# Patient Record
Sex: MALE | Race: WHITE | NOT HISPANIC OR LATINO | Employment: FULL TIME | URBAN - METROPOLITAN AREA
[De-identification: names, ages, dates, MRNs, and addresses within clinical notes are randomized per-mention and may not be internally consistent; named-entity substitution may affect disease eponyms.]

---

## 2023-03-27 DIAGNOSIS — I10 HYPERTENSION, UNSPECIFIED TYPE: ICD-10-CM

## 2023-03-27 RX ORDER — AMLODIPINE BESYLATE 5 MG/1
5 TABLET ORAL DAILY
Qty: 30 TABLET | Refills: 0 | Status: SHIPPED | OUTPATIENT
Start: 2023-03-27 | End: 2023-04-05

## 2023-03-27 RX ORDER — TAMSULOSIN HYDROCHLORIDE 0.4 MG/1
0.4 CAPSULE ORAL DAILY
Qty: 30 CAPSULE | Refills: 0 | Status: SHIPPED | OUTPATIENT
Start: 2023-03-27 | End: 2023-04-05

## 2023-04-05 DIAGNOSIS — N40.1 BENIGN PROSTATIC HYPERPLASIA WITH LOWER URINARY TRACT SYMPTOMS, SYMPTOM DETAILS UNSPECIFIED: Primary | ICD-10-CM

## 2023-04-05 DIAGNOSIS — I10 HYPERTENSION, UNSPECIFIED TYPE: ICD-10-CM

## 2023-04-05 RX ORDER — AMLODIPINE BESYLATE 5 MG/1
TABLET ORAL
Qty: 30 TABLET | Refills: 0 | Status: SHIPPED | OUTPATIENT
Start: 2023-04-05 | End: 2023-04-17 | Stop reason: SDUPTHER

## 2023-04-05 RX ORDER — TAMSULOSIN HYDROCHLORIDE 0.4 MG/1
CAPSULE ORAL
Qty: 30 CAPSULE | Refills: 0 | Status: SHIPPED | OUTPATIENT
Start: 2023-04-05 | End: 2023-04-17 | Stop reason: SDUPTHER

## 2023-04-17 ENCOUNTER — OFFICE VISIT (OUTPATIENT)
Dept: PRIMARY CARE | Facility: CLINIC | Age: 53
End: 2023-04-17
Payer: COMMERCIAL

## 2023-04-17 VITALS
HEIGHT: 73 IN | BODY MASS INDEX: 29.29 KG/M2 | HEART RATE: 65 BPM | DIASTOLIC BLOOD PRESSURE: 80 MMHG | SYSTOLIC BLOOD PRESSURE: 126 MMHG | WEIGHT: 221 LBS

## 2023-04-17 DIAGNOSIS — I10 PRIMARY HYPERTENSION: ICD-10-CM

## 2023-04-17 DIAGNOSIS — F90.1 ADHD, HYPERACTIVE-IMPULSIVE TYPE: Primary | ICD-10-CM

## 2023-04-17 DIAGNOSIS — M19.049 HAND ARTHRITIS: ICD-10-CM

## 2023-04-17 DIAGNOSIS — R35.0 BENIGN PROSTATIC HYPERPLASIA WITH URINARY FREQUENCY: ICD-10-CM

## 2023-04-17 DIAGNOSIS — N40.1 BENIGN PROSTATIC HYPERPLASIA WITH URINARY FREQUENCY: ICD-10-CM

## 2023-04-17 PROBLEM — N40.0 BPH (BENIGN PROSTATIC HYPERPLASIA): Status: ACTIVE | Noted: 2023-04-17

## 2023-04-17 PROCEDURE — 99214 OFFICE O/P EST MOD 30 MIN: CPT | Performed by: FAMILY MEDICINE

## 2023-04-17 PROCEDURE — 3079F DIAST BP 80-89 MM HG: CPT | Performed by: FAMILY MEDICINE

## 2023-04-17 PROCEDURE — 3074F SYST BP LT 130 MM HG: CPT | Performed by: FAMILY MEDICINE

## 2023-04-17 RX ORDER — AMLODIPINE BESYLATE 5 MG/1
TABLET ORAL
Qty: 90 TABLET | Refills: 1 | Status: SHIPPED | OUTPATIENT
Start: 2023-04-17 | End: 2023-08-17 | Stop reason: SDUPTHER

## 2023-04-17 RX ORDER — DEXTROAMPHETAMINE SACCHARATE, AMPHETAMINE ASPARTATE MONOHYDRATE, DEXTROAMPHETAMINE SULFATE AND AMPHETAMINE SULFATE 5; 5; 5; 5 MG/1; MG/1; MG/1; MG/1
20 CAPSULE, EXTENDED RELEASE ORAL EVERY MORNING
Qty: 30 CAPSULE | Refills: 0 | Status: CANCELLED | OUTPATIENT
Start: 2023-05-17 | End: 2023-06-16

## 2023-04-17 RX ORDER — DEXTROAMPHETAMINE SACCHARATE, AMPHETAMINE ASPARTATE MONOHYDRATE, DEXTROAMPHETAMINE SULFATE AND AMPHETAMINE SULFATE 5; 5; 5; 5 MG/1; MG/1; MG/1; MG/1
20 CAPSULE, EXTENDED RELEASE ORAL EVERY MORNING
Qty: 90 CAPSULE | Refills: 0 | Status: SHIPPED | OUTPATIENT
Start: 2023-04-17 | End: 2023-04-18 | Stop reason: RX

## 2023-04-17 RX ORDER — DEXTROAMPHETAMINE SACCHARATE, AMPHETAMINE ASPARTATE, DEXTROAMPHETAMINE SULFATE AND AMPHETAMINE SULFATE 2.5; 2.5; 2.5; 2.5 MG/1; MG/1; MG/1; MG/1
10 TABLET ORAL DAILY
Qty: 90 TABLET | Refills: 0 | Status: SHIPPED | OUTPATIENT
Start: 2023-04-17 | End: 2023-04-18 | Stop reason: RX

## 2023-04-17 RX ORDER — DEXTROAMPHETAMINE SACCHARATE, AMPHETAMINE ASPARTATE, DEXTROAMPHETAMINE SULFATE AND AMPHETAMINE SULFATE 2.5; 2.5; 2.5; 2.5 MG/1; MG/1; MG/1; MG/1
10 TABLET ORAL DAILY
Qty: 30 TABLET | Refills: 0 | Status: CANCELLED | OUTPATIENT
Start: 2023-04-17 | End: 2023-05-17

## 2023-04-17 RX ORDER — TAMSULOSIN HYDROCHLORIDE 0.4 MG/1
0.4 CAPSULE ORAL DAILY
Qty: 90 CAPSULE | Refills: 1 | Status: SHIPPED | OUTPATIENT
Start: 2023-04-17 | End: 2023-08-17 | Stop reason: WASHOUT

## 2023-04-17 RX ORDER — DEXTROAMPHETAMINE SACCHARATE, AMPHETAMINE ASPARTATE MONOHYDRATE, DEXTROAMPHETAMINE SULFATE AND AMPHETAMINE SULFATE 5; 5; 5; 5 MG/1; MG/1; MG/1; MG/1
20 CAPSULE, EXTENDED RELEASE ORAL EVERY MORNING
Qty: 30 CAPSULE | Refills: 0 | Status: CANCELLED | OUTPATIENT
Start: 2023-04-17 | End: 2023-05-17

## 2023-04-17 RX ORDER — DEXTROAMPHETAMINE SULFATE, DEXTROAMPHETAMINE SACCHARATE, AMPHETAMINE SULFATE AND AMPHETAMINE ASPARTATE 5; 5; 5; 5 MG/1; MG/1; MG/1; MG/1
CAPSULE, EXTENDED RELEASE ORAL
COMMUNITY
Start: 2022-10-09 | End: 2023-04-17 | Stop reason: ALTCHOICE

## 2023-04-17 RX ORDER — DEXTROAMPHETAMINE SACCHARATE, AMPHETAMINE ASPARTATE, DEXTROAMPHETAMINE SULFATE AND AMPHETAMINE SULFATE 2.5; 2.5; 2.5; 2.5 MG/1; MG/1; MG/1; MG/1
10 TABLET ORAL DAILY
Qty: 30 TABLET | Refills: 0 | Status: CANCELLED | OUTPATIENT
Start: 2023-05-17 | End: 2023-06-16

## 2023-04-17 RX ORDER — DEXTROAMPHETAMINE SACCHARATE, AMPHETAMINE ASPARTATE, DEXTROAMPHETAMINE SULFATE AND AMPHETAMINE SULFATE 2.5; 2.5; 2.5; 2.5 MG/1; MG/1; MG/1; MG/1
TABLET ORAL
COMMUNITY
End: 2023-04-17 | Stop reason: ALTCHOICE

## 2023-04-17 ASSESSMENT — PATIENT HEALTH QUESTIONNAIRE - PHQ9
1. LITTLE INTEREST OR PLEASURE IN DOING THINGS: NOT AT ALL
2. FEELING DOWN, DEPRESSED OR HOPELESS: NOT AT ALL
SUM OF ALL RESPONSES TO PHQ9 QUESTIONS 1 AND 2: 0

## 2023-04-17 NOTE — PROGRESS NOTES
"Subjective   Patient ID: Leo Lambert Jr. is a 53 y.o. male who presents for ADHD, Hypertension, Benign Prostatic Hypertrophy, and Arthritis.    HPI  1.  ADHD.  Currently taking Adderall 20 mg ER each morning and 10 mg in the afternoon PRN  States this continues to work well for focus and attention   Denies issues with mood/anxiety, sleep, or appetite   Controlled substance and drug screen done July 2022  Tolerating well and requesting refill today     2.  Hypertension.  Currently taking amlodipine 5 mg daily   Tolerating well without side effects   Denies cardiopulmonary symptoms   Requesting refill today     3. BPH  Currently taking tamsulosin 0.4 mg   States this continues to control urinary symptoms well   Tolerating well and requesting refill     4. R hand arthritis   States he has been experiencing more pain in his R hand recently   Does have history of arthritis for which he received steroid shots back in 2017  States the steroid injection did provide relief for some time   He is inquiring about further treatment or repeat injections     Review of Systems  All pertinent positive symptoms are included in the history of present illness.    All other systems have been reviewed and are negative and noncontributory to this patient's current ailments.    No Known Allergies      There is no immunization history on file for this patient.    Objective   Visit Vitals  /80 (BP Location: Left arm, Patient Position: Sitting, BP Cuff Size: Adult)   Pulse 65   Ht 1.854 m (6' 1\")   Wt 100 kg (221 lb)   BMI 29.16 kg/m²   Smoking Status Never   BSA 2.27 m²       Physical Exam  CONSTITUTIONAL - well nourished, well developed, looks like stated age, in no acute distress, not ill-appearing, and not tired appearing  SKIN - normal skin color and pigmentation, normal skin turgor without rash, lesions, or nodules visualized  HEAD - no trauma, normocephalic  EYES - extraocular muscles are intact, and normal external " exam  CARDIAC - regular rate and regular rhythm, no skipped beats, no murmur  EXTREMITIES - no edema, no deformities  NEUROLOGICAL - normal gait, normal balance, normal motor, no ataxia, alert, oriented  PSYCHIATRIC - alert, pleasant and cordial, age-appropriate      Assessment/Plan   Problem List Items Addressed This Visit       ADHD, hyperactive-impulsive type - Primary     Stable and well controlled on current medication.   Will continue Adderall ER 20 mg and Adderall 10 mg in the afternoon as needed.   UDS and controlled substance agreement up to date.   Refills provided today.            Relevant Medications    amphetamine-dextroamphetamine XR (Adderall XR) 20 mg 24 hr capsule    amphetamine-dextroamphetamine (Adderall) 10 mg tablet    Primary hypertension     Stable and well controlled.  Continue amlodipine 5 mg daily.   Refill provided today.          Relevant Medications    amLODIPine (Norvasc) 5 mg tablet    BPH (benign prostatic hyperplasia)     Stable and well controlled.   Continue tamsulosin 0.4 mg daily.   Refill provided today.         Relevant Medications    tamsulosin (Flomax) 0.4 mg 24 hr capsule    Hand arthritis     Information for a hand surgeon was provided today.   It does appear that you last received steroid injection to the MC joint of the R long finger as well as aspiration and injection of a R hand ganglion cyst back in 2017 with Dr. Misha Hernandez.           I reviewed the attendee's documentation and discussed the patient with the attendee. I agree with the attendee's medical decision making as documented on the attendee's note.

## 2023-04-17 NOTE — ASSESSMENT & PLAN NOTE
Information for a hand surgeon was provided today.   It does appear that you last received steroid injection to the MC joint of the R long finger as well as aspiration and injection of a R hand ganglion cyst back in 2017 with Dr. Misha Hernandez.

## 2023-04-17 NOTE — ASSESSMENT & PLAN NOTE
Stable and well controlled on current medication.   Will continue Adderall ER 20 mg and Adderall 10 mg in the afternoon as needed.   UDS and controlled substance agreement up to date.   Refills provided today.

## 2023-04-18 ENCOUNTER — TELEPHONE (OUTPATIENT)
Dept: PRIMARY CARE | Facility: CLINIC | Age: 53
End: 2023-04-18
Payer: COMMERCIAL

## 2023-04-18 DIAGNOSIS — F90.1 ADHD, HYPERACTIVE-IMPULSIVE TYPE: Primary | ICD-10-CM

## 2023-04-18 RX ORDER — DEXTROAMPHETAMINE SACCHARATE, AMPHETAMINE ASPARTATE, DEXTROAMPHETAMINE SULFATE AND AMPHETAMINE SULFATE 2.5; 2.5; 2.5; 2.5 MG/1; MG/1; MG/1; MG/1
TABLET ORAL
Qty: 30 TABLET | Refills: 0 | Status: SHIPPED | OUTPATIENT
Start: 2023-05-18 | End: 2023-08-17 | Stop reason: ALTCHOICE

## 2023-04-18 RX ORDER — DEXTROAMPHETAMINE SACCHARATE, AMPHETAMINE ASPARTATE, DEXTROAMPHETAMINE SULFATE AND AMPHETAMINE SULFATE 2.5; 2.5; 2.5; 2.5 MG/1; MG/1; MG/1; MG/1
TABLET ORAL
Qty: 30 TABLET | Refills: 0 | Status: SHIPPED | OUTPATIENT
Start: 2023-06-17 | End: 2023-08-17 | Stop reason: ALTCHOICE

## 2023-04-18 RX ORDER — DEXTROAMPHETAMINE SACCHARATE, AMPHETAMINE ASPARTATE MONOHYDRATE, DEXTROAMPHETAMINE SULFATE AND AMPHETAMINE SULFATE 5; 5; 5; 5 MG/1; MG/1; MG/1; MG/1
20 CAPSULE, EXTENDED RELEASE ORAL EVERY MORNING
Qty: 30 CAPSULE | Refills: 0 | Status: SHIPPED | OUTPATIENT
Start: 2023-04-18 | End: 2023-08-17 | Stop reason: ALTCHOICE

## 2023-04-18 RX ORDER — DEXTROAMPHETAMINE SACCHARATE, AMPHETAMINE ASPARTATE, DEXTROAMPHETAMINE SULFATE AND AMPHETAMINE SULFATE 2.5; 2.5; 2.5; 2.5 MG/1; MG/1; MG/1; MG/1
TABLET ORAL
Qty: 30 TABLET | Refills: 0 | Status: SHIPPED | OUTPATIENT
Start: 2023-04-18 | End: 2023-08-17 | Stop reason: ALTCHOICE

## 2023-04-18 RX ORDER — DEXTROAMPHETAMINE SACCHARATE, AMPHETAMINE ASPARTATE MONOHYDRATE, DEXTROAMPHETAMINE SULFATE AND AMPHETAMINE SULFATE 5; 5; 5; 5 MG/1; MG/1; MG/1; MG/1
20 CAPSULE, EXTENDED RELEASE ORAL EVERY MORNING
Qty: 30 CAPSULE | Refills: 0 | Status: SHIPPED | OUTPATIENT
Start: 2023-05-18 | End: 2023-08-17 | Stop reason: ALTCHOICE

## 2023-04-18 RX ORDER — DEXTROAMPHETAMINE SACCHARATE, AMPHETAMINE ASPARTATE MONOHYDRATE, DEXTROAMPHETAMINE SULFATE AND AMPHETAMINE SULFATE 5; 5; 5; 5 MG/1; MG/1; MG/1; MG/1
20 CAPSULE, EXTENDED RELEASE ORAL EVERY MORNING
Qty: 30 CAPSULE | Refills: 0 | Status: SHIPPED | OUTPATIENT
Start: 2023-06-17 | End: 2023-08-17 | Stop reason: ALTCHOICE

## 2023-08-17 ENCOUNTER — OFFICE VISIT (OUTPATIENT)
Dept: PRIMARY CARE | Facility: CLINIC | Age: 53
End: 2023-08-17
Payer: COMMERCIAL

## 2023-08-17 VITALS
WEIGHT: 220 LBS | SYSTOLIC BLOOD PRESSURE: 132 MMHG | DIASTOLIC BLOOD PRESSURE: 90 MMHG | BODY MASS INDEX: 29.03 KG/M2 | HEART RATE: 70 BPM

## 2023-08-17 DIAGNOSIS — F90.1 ADHD, HYPERACTIVE-IMPULSIVE TYPE: ICD-10-CM

## 2023-08-17 DIAGNOSIS — R20.0 NUMBNESS OF LIP: ICD-10-CM

## 2023-08-17 DIAGNOSIS — M17.32 POST-TRAUMATIC OSTEOARTHRITIS OF LEFT KNEE: ICD-10-CM

## 2023-08-17 DIAGNOSIS — Z79.899 MEDICATION MANAGEMENT: ICD-10-CM

## 2023-08-17 DIAGNOSIS — Z12.5 PROSTATE CANCER SCREENING: ICD-10-CM

## 2023-08-17 DIAGNOSIS — G89.29 CHRONIC PAIN OF LEFT KNEE: ICD-10-CM

## 2023-08-17 DIAGNOSIS — M25.562 CHRONIC PAIN OF LEFT KNEE: ICD-10-CM

## 2023-08-17 DIAGNOSIS — Z11.59 NEED FOR HEPATITIS C SCREENING TEST: ICD-10-CM

## 2023-08-17 DIAGNOSIS — I10 PRIMARY HYPERTENSION: ICD-10-CM

## 2023-08-17 DIAGNOSIS — M25.862 CYST OF LEFT KNEE JOINT: ICD-10-CM

## 2023-08-17 DIAGNOSIS — Z00.00 ANNUAL PHYSICAL EXAM: Primary | ICD-10-CM

## 2023-08-17 DIAGNOSIS — Z11.4 ENCOUNTER FOR SCREENING FOR HIV: ICD-10-CM

## 2023-08-17 DIAGNOSIS — M19.041 PRIMARY OSTEOARTHRITIS OF RIGHT HAND: ICD-10-CM

## 2023-08-17 PROBLEM — M51.36 DEGENERATION OF INTERVERTEBRAL DISC OF LUMBAR REGION: Status: ACTIVE | Noted: 2023-08-17

## 2023-08-17 PROBLEM — M47.816 FACET ARTHROPATHY, LUMBAR: Status: ACTIVE | Noted: 2023-08-17

## 2023-08-17 PROBLEM — D56.3 THALASSEMIA TRAIT: Status: ACTIVE | Noted: 2023-08-17

## 2023-08-17 PROBLEM — M51.369 DEGENERATION OF INTERVERTEBRAL DISC OF LUMBAR REGION: Status: ACTIVE | Noted: 2023-08-17

## 2023-08-17 PROBLEM — G47.33 OBSTRUCTIVE SLEEP APNEA, ADULT: Status: ACTIVE | Noted: 2023-08-17

## 2023-08-17 PROBLEM — R01.1 SYSTOLIC MURMUR: Status: ACTIVE | Noted: 2023-08-17

## 2023-08-17 LAB
AMPHETAMINE (PRESENCE) IN URINE BY SCREEN METHOD: ABNORMAL
BARBITURATES PRESENCE IN URINE BY SCREEN METHOD: ABNORMAL
BENZODIAZEPINE (PRESENCE) IN URINE BY SCREEN METHOD: ABNORMAL
CANNABINOIDS IN URINE BY SCREEN METHOD: ABNORMAL
COCAINE (PRESENCE) IN URINE BY SCREEN METHOD: ABNORMAL
DRUG SCREEN COMMENT URINE: ABNORMAL
FENTANYL URINE: ABNORMAL
METHADONE (PRESENCE) IN URINE BY SCREEN METHOD: ABNORMAL
OPIATES (PRESENCE) IN URINE BY SCREEN METHOD: ABNORMAL
OXYCODONE (PRESENCE) IN URINE BY SCREEN METHOD: ABNORMAL
PHENCYCLIDINE (PRESENCE) IN URINE BY SCREEN METHOD: ABNORMAL
POC APPEARANCE, URINE: CLEAR
POC BILIRUBIN, URINE: NEGATIVE
POC BLOOD, URINE: NEGATIVE
POC COLOR, URINE: YELLOW
POC GLUCOSE, URINE: NEGATIVE MG/DL
POC KETONES, URINE: NEGATIVE MG/DL
POC LEUKOCYTES, URINE: NEGATIVE
POC NITRITE,URINE: NEGATIVE
POC PH, URINE: 6.5 PH
POC PROTEIN, URINE: NEGATIVE MG/DL
POC SPECIFIC GRAVITY, URINE: 1.01
POC UROBILINOGEN, URINE: 0.2 EU/DL

## 2023-08-17 PROCEDURE — 81002 URINALYSIS NONAUTO W/O SCOPE: CPT | Performed by: FAMILY MEDICINE

## 2023-08-17 PROCEDURE — 99214 OFFICE O/P EST MOD 30 MIN: CPT | Performed by: FAMILY MEDICINE

## 2023-08-17 PROCEDURE — 80307 DRUG TEST PRSMV CHEM ANLYZR: CPT

## 2023-08-17 PROCEDURE — 80324 DRUG SCREEN AMPHETAMINES 1/2: CPT

## 2023-08-17 PROCEDURE — 3080F DIAST BP >= 90 MM HG: CPT | Performed by: FAMILY MEDICINE

## 2023-08-17 PROCEDURE — 1036F TOBACCO NON-USER: CPT | Performed by: FAMILY MEDICINE

## 2023-08-17 PROCEDURE — 3075F SYST BP GE 130 - 139MM HG: CPT | Performed by: FAMILY MEDICINE

## 2023-08-17 PROCEDURE — 99396 PREV VISIT EST AGE 40-64: CPT | Performed by: FAMILY MEDICINE

## 2023-08-17 PROCEDURE — 93000 ELECTROCARDIOGRAM COMPLETE: CPT | Performed by: FAMILY MEDICINE

## 2023-08-17 RX ORDER — DEXTROAMPHETAMINE SACCHARATE, AMPHETAMINE ASPARTATE, DEXTROAMPHETAMINE SULFATE AND AMPHETAMINE SULFATE 2.5; 2.5; 2.5; 2.5 MG/1; MG/1; MG/1; MG/1
10 TABLET ORAL DAILY
Qty: 30 TABLET | Refills: 0 | Status: SHIPPED | OUTPATIENT
Start: 2023-10-16 | End: 2024-01-23 | Stop reason: SDUPTHER

## 2023-08-17 RX ORDER — DEXTROAMPHETAMINE SACCHARATE, AMPHETAMINE ASPARTATE MONOHYDRATE, DEXTROAMPHETAMINE SULFATE AND AMPHETAMINE SULFATE 5; 5; 5; 5 MG/1; MG/1; MG/1; MG/1
20 CAPSULE, EXTENDED RELEASE ORAL EVERY MORNING
COMMUNITY
Start: 2018-01-03 | End: 2023-08-17 | Stop reason: ALTCHOICE

## 2023-08-17 RX ORDER — DEXTROAMPHETAMINE SACCHARATE, AMPHETAMINE ASPARTATE MONOHYDRATE, DEXTROAMPHETAMINE SULFATE AND AMPHETAMINE SULFATE 5; 5; 5; 5 MG/1; MG/1; MG/1; MG/1
20 CAPSULE, EXTENDED RELEASE ORAL EVERY MORNING
Qty: 30 CAPSULE | Refills: 0 | Status: SHIPPED | OUTPATIENT
Start: 2023-08-17 | End: 2024-01-23 | Stop reason: SDUPTHER

## 2023-08-17 RX ORDER — DEXTROAMPHETAMINE SACCHARATE, AMPHETAMINE ASPARTATE, DEXTROAMPHETAMINE SULFATE AND AMPHETAMINE SULFATE 2.5; 2.5; 2.5; 2.5 MG/1; MG/1; MG/1; MG/1
10 TABLET ORAL DAILY
Qty: 30 TABLET | Refills: 0 | Status: SHIPPED | OUTPATIENT
Start: 2023-08-17 | End: 2024-01-23 | Stop reason: SDUPTHER

## 2023-08-17 RX ORDER — DEXTROAMPHETAMINE SACCHARATE, AMPHETAMINE ASPARTATE, DEXTROAMPHETAMINE SULFATE AND AMPHETAMINE SULFATE 2.5; 2.5; 2.5; 2.5 MG/1; MG/1; MG/1; MG/1
10 TABLET ORAL DAILY
Qty: 30 TABLET | Refills: 0 | Status: SHIPPED | OUTPATIENT
Start: 2023-09-16 | End: 2024-01-23 | Stop reason: SDUPTHER

## 2023-08-17 RX ORDER — AMLODIPINE BESYLATE 10 MG/1
10 TABLET ORAL DAILY
Qty: 90 TABLET | Refills: 1 | Status: SHIPPED | OUTPATIENT
Start: 2023-08-17 | End: 2024-01-23 | Stop reason: SDUPTHER

## 2023-08-17 RX ORDER — TADALAFIL 20 MG/1
20 TABLET ORAL DAILY PRN
COMMUNITY
Start: 2017-04-13

## 2023-08-17 RX ORDER — MELOXICAM 7.5 MG/1
1 TABLET ORAL 2 TIMES DAILY PRN
COMMUNITY
Start: 2021-10-18

## 2023-08-17 RX ORDER — DEXTROAMPHETAMINE SACCHARATE, AMPHETAMINE ASPARTATE, DEXTROAMPHETAMINE SULFATE AND AMPHETAMINE SULFATE 2.5; 2.5; 2.5; 2.5 MG/1; MG/1; MG/1; MG/1
10 TABLET ORAL DAILY
COMMUNITY
Start: 2018-01-03 | End: 2023-08-17 | Stop reason: ALTCHOICE

## 2023-08-17 RX ORDER — DEXTROAMPHETAMINE SACCHARATE, AMPHETAMINE ASPARTATE MONOHYDRATE, DEXTROAMPHETAMINE SULFATE AND AMPHETAMINE SULFATE 5; 5; 5; 5 MG/1; MG/1; MG/1; MG/1
20 CAPSULE, EXTENDED RELEASE ORAL EVERY MORNING
Qty: 30 CAPSULE | Refills: 0 | Status: SHIPPED | OUTPATIENT
Start: 2023-10-16 | End: 2024-01-23 | Stop reason: SDUPTHER

## 2023-08-17 RX ORDER — DEXTROAMPHETAMINE SACCHARATE, AMPHETAMINE ASPARTATE MONOHYDRATE, DEXTROAMPHETAMINE SULFATE AND AMPHETAMINE SULFATE 5; 5; 5; 5 MG/1; MG/1; MG/1; MG/1
20 CAPSULE, EXTENDED RELEASE ORAL EVERY MORNING
Qty: 30 CAPSULE | Refills: 0 | Status: SHIPPED | OUTPATIENT
Start: 2023-09-16 | End: 2024-01-23 | Stop reason: SDUPTHER

## 2023-08-17 ASSESSMENT — PATIENT HEALTH QUESTIONNAIRE - PHQ9
SUM OF ALL RESPONSES TO PHQ9 QUESTIONS 1 AND 2: 0
1. LITTLE INTEREST OR PLEASURE IN DOING THINGS: NOT AT ALL
2. FEELING DOWN, DEPRESSED OR HOPELESS: NOT AT ALL

## 2023-08-17 NOTE — ASSESSMENT & PLAN NOTE
Complete history and physical examination was performed  EKG reveals sinus rhythm with some nonspecific T wave abnormality especially in V4-V6    Recommended Shingrix, but declined

## 2023-08-17 NOTE — ASSESSMENT & PLAN NOTE
Increase amlodipine to 10 mg to lower BP  I would like to have you monitor and record blood pressures at home   Blood pressure goal should be below 130/80, ideally 120/80  If the blood pressure is too high or too low, we need to consider making adjustments to your antihypertensive therapy

## 2023-08-17 NOTE — ASSESSMENT & PLAN NOTE
Consultation with orthopedic surgery to further evaluate and form a plan which may include cortisone injections

## 2023-08-17 NOTE — ASSESSMENT & PLAN NOTE
Continue on Adderall 20mg daily and Adderall 10mg as needed  Will switch pharmacies as discussed   Completed controlled substance agreement and urine drug screening in office today

## 2023-08-17 NOTE — PROGRESS NOTES
Subjective   Patient ID: Jason Lambert Jr. is a 53 y.o. male who presents for ADHD, Hypertension, and Annual Exam.    HPI  Jason presents today for annual health maintenance and to discuss multiple conditions     1.  Physical exam.  Colonoscopy: last done April 2016 with 10 year recommended   Immunizations: Tdap 2016   Deferred Shingles vaccines today  Hep C, HIV screen will be obtained today  Sees dentist and eye doctor regularly   Diet: follows no specific diet   Exercise: Occasional  EtOH use: occasional, a couple drinks per week   Never smoker      2.  Arthritis.  States symptoms of right hand pain that has gotten worse over the last year  Swelling, limited ROM, pain with activity noted  Years ago had cortisone injections in R hand, willing to consider that again  Requesting referral to ortho today     3.  ADHD.  Taking Adderall 20 mg Mon-Fri and 10 mg as needed   Tolerating well without side effects and requesting refill today   Last UDS and controlled substance agreement in Dec. 2022    I have personally reviewed the [place] report for JASON LAMBERT. I have considered the risks of abuse, dependence, addiction and diversion.   Last urine drug screening date/ordered today: 8/17/2023    Controlled Substance Agreement:   I have printed this form and reviewed each line item with the patient and the patient has verbalized understanding.   Date of the last Controlled Substance Agreement: 8/17/2023     4.  Hypertension.  Taking amlodipine 5 mg daily as prescribed   Tolerating well without side effects   Denies cardiopulmonary symptoms   Home BP readings have been more elevated since recent weight gain/less physical activity  BP elevated today in the office as well  Requesting refill today      5.  BPH.  Taking tamsulosin 0.4 mg   Feels this continues to control symptoms well   Tolerating well and requesting refill today     6.  Left knee pain.  History of ACL reconstruction 15 years ago   Ortho previously recommended  "arthroplasty due to pain, deferred due to pain  Raised, painful lesion appeared within last 2 months, and although the pain is improved, the nodule has become a bit more calcified    7.  Right upper lip numbness.  Cold and wet sensation in right upper lip that started about 2 to 3 weeks ago but has since resolved  States no lesion, skin irritation, rash, cut, etc. when this occurred, felt like \"toothpaste was crusting around the area\", and no associated paralysis    Review of Systems  All pertinent positive symptoms are included in the history of present illness.    All other systems have been reviewed and are negative and noncontributory to this patient's current ailments.    No Known Allergies    There is no immunization history on file for this patient.    Objective   Visit Vitals  /90   Pulse 70   Wt 99.8 kg (220 lb)   BMI 29.03 kg/m²   Smoking Status Never   BSA 2.27 m²       Physical Exam  CONSTITUTIONAL - well nourished, well developed, looks like stated age, in no acute distress, not ill-appearing, and not tired appearing  SKIN - normal skin color and pigmentation, normal skin turgor without rash, lesions, or nodules visualized  HEAD - no trauma, normocephalic  EYES - pupils are equal and reactive to light, extraocular muscles are intact, and normal external exam  ENT - TM's intact, no injection, no signs of infection, uvula midline, normal tongue movement and throat normal, no exudate  NECK - supple without rigidity, no neck mass was observed  CHEST - clear to auscultation, no wheezing, no crackles and no rales, good effort  CARDIAC - regular rate and regular rhythm, no skipped beats, no murmur  ABDOMEN - no organomegaly, soft, nontender, nondistended, normal bowel sounds, no guarding/rebound/rigidity, negative Armstrong sign  EXTREMITIES - no edema, no deformities; left knee with some scarring from previous surgery, and just along the scar, there is a raised, circular, golf ball sized nontender, " relatively mobile mass  NEUROLOGICAL - normal gait, normal balance, normal motor, no ataxia, DTRs equal and symmetrical; alert, oriented and no focal signs  PSYCHIATRIC - alert, pleasant and cordial, age-appropriate  IMMUNOLOGIC - no cervical lymphadenopathy     Assessment/Plan   Problem List Items Addressed This Visit       ADHD, hyperactive-impulsive type     Continue on Adderall 20mg daily and Adderall 10mg as needed  Will switch pharmacies as discussed   Completed controlled substance agreement and urine drug screening in office today         Relevant Medications    amphetamine-dextroamphetamine XR (Adderall XR) 20 mg 24 hr capsule    amphetamine-dextroamphetamine XR (Adderall XR) 20 mg 24 hr capsule (Start on 9/16/2023)    amphetamine-dextroamphetamine XR (Adderall XR) 20 mg 24 hr capsule (Start on 10/16/2023)    amphetamine-dextroamphetamine (Adderall) 10 mg tablet    amphetamine-dextroamphetamine (Adderall) 10 mg tablet (Start on 9/16/2023)    amphetamine-dextroamphetamine (Adderall) 10 mg tablet (Start on 10/16/2023)    Primary hypertension     Increase amlodipine to 10 mg to lower BP  I would like to have you monitor and record blood pressures at home   Blood pressure goal should be below 130/80, ideally 120/80  If the blood pressure is too high or too low, we need to consider making adjustments to your antihypertensive therapy         Relevant Medications    amLODIPine (Norvasc) 10 mg tablet    Arthritis of hand, right, degenerative     Consultation with orthopedic surgery to further evaluate and form a plan which may include cortisone injections         Relevant Orders    Referral to Orthopaedic Surgery    Post-traumatic osteoarthritis of left knee     Orthopedic consult to further evaluate with the possibility of arthroscopic procedure         Annual physical exam - Primary     Complete history and physical examination was performed  EKG reveals sinus rhythm with some nonspecific T wave abnormality  especially in V4-V6    Recommended Shingrix, but declined         Relevant Orders    Lipid Panel    Prostate Specific Antigen    TSH with reflex to Free T4 if abnormal    Comprehensive Metabolic Panel    CBC and Auto Differential    HIV 1/2 Antigen/Antibody Screen with Reflex to Confirmation    Hepatitis C Antibody    ECG 12 lead (Completed)    POCT UA (nonautomated) manually resulted (Completed)    Prostate cancer screening     Routine health maintenance PSA screening collected today         Relevant Orders    Prostate Specific Antigen    Encounter for screening for HIV    Relevant Orders    HIV 1/2 Antigen/Antibody Screen with Reflex to Confirmation    Cyst of left knee joint     Likely a ganglion cyst that will need further evaluation and possibly excision by orthopedics         Relevant Orders    Referral to Orthopaedic Surgery    Chronic pain of left knee    Numbness of lip     Encouraged Shingles vaccine, but no other treatment at this time as condition has resolved    Uncertain as the etiology, so if it recurs, let me know         Need for hepatitis C screening test    Relevant Orders    Hepatitis C Antibody     Other Visit Diagnoses       Medication management        Relevant Orders    Drug Screen, Urine With Reflex to Confirmation (Completed)

## 2023-08-17 NOTE — PROGRESS NOTES
Subjective   Patient ID: Jason Lambert Jr. is a 53 y.o. male who presents for health maintenance examination.     I have personally reviewed the [place] report for JASON LAMBERT. I have considered the risks of abuse, dependence, addiction and diversion.   Last urine drug screening date/ordered today: 8/17/2023  Controlled Substance Agreement:   I have printed this form and reviewed each line item with the patient and the patient has verbalized understanding.   Date of the last Controlled Substance Agreement: 8/17/2023     Jason presents today for annual health maintenance and to discuss multiple conditions     1.  Physical exam.  Colonoscopy: last done April 2016 with 10 year recommended   Immunizations: Tdap 2016   Deferred Shingles vaccines today  Hep C, HIV screen will be obtained today  Sees dentist and eye doctor regularly   Diet: follows no specific diet   Exercise: Occasional  EtOH use: occasional, a couple drinks per week   Never smoker      2. Arthritis:   States symptoms of right hand pain that has gotten worse over the last year  Swelling, limited ROM, pain with activity noted  Years ago had cortisone injections in R hand   Requesting referral to ortho today     3. ADHD:  Taking Adderall 20 mg Mon-Fri and 10 mg as needed   Tolerating well without side effects and requesting refill today   Last UDS and controlled substance agreement in Dec. 2022     4. HTN:  Taking amlodipine 5 mg daily as prescribed   Tolerating well without side effects   Denies cardiopulmonary symptoms   Home BP readings have been more elevated since recent weight gain/less physical activity  Requesting refill today    5. Left knee pain/nodule  History of ACL reconstruction 15 years ago   Ortho previously recommended arthroplasty due to pain, deferred  Raised, painful lesion appeared within last 2 months    6. Right upper lip numbness  Cold and wet sensation in right upper lip  Lasted for several weeks but resolved  States no lesion,  skin irritation, rash, cut, etc.      Review of Systems  All pertinent positive symptoms are included in the history of present illness.    All other systems have been reviewed and are negative and noncontributory to this patient's current ailments.    No Known Allergies      There is no immunization history on file for this patient.    Objective   Visit Vitals  /88   Pulse 70   Wt 99.8 kg (220 lb)   BMI 29.03 kg/m²   Smoking Status Never   BSA 2.27 m²       Physical Exam  CONSTITUTIONAL - well nourished, well developed, looks like stated age, in no acute distress, not ill-appearing, and not tired appearing  SKIN - normal skin color and pigmentation, normal skin turgor without rash, lesions, or nodules visualized  HEAD - no trauma, normocephalic  EYES - pupils are equal and reactive to light, extraocular muscles are intact, and normal external exam  ENT - TM's intact, no injection, no signs of infection, uvula midline, normal tongue movement and throat normal, no exudate  NECK - supple without rigidity, no neck mass was observed  CHEST - clear to auscultation, no wheezing, no crackles and no rales, good effort  CARDIAC - regular rate and regular rhythm, no skipped beats, no murmur  ABDOMEN - no organomegaly, soft, nontender, nondistended, normal bowel sounds, no guarding/rebound/rigidity, negative Armstrong sign  EXTREMITIES - no edema, no deformities  NEUROLOGICAL - normal gait, normal balance, normal motor, no ataxia, alert, oriented and no focal signs  PSYCHIATRIC - alert, pleasant and cordial, age-appropriate  IMMUNOLOGIC - no cervical lymphadenopathy     Assessment/Plan   Problem List Items Addressed This Visit       ADHD, hyperactive-impulsive type     Continue on Adderall 20mg daily and Adderall 10mg as needed  Will switch pharmacies as discussed   Completed controlled substance agreement and urine drug screening in office today         Primary hypertension     Increase amlodipine to 10mg to lower BP          Arthritis of hand, right, degenerative    Relevant Orders    Referral to Orthopaedic Surgery    Annual physical exam - Primary     Complete history and physical examination was performed  EKG reveals normal sinus rhythm without acute changes  Requisition for CBC, CMP, TSH, lipid, A1c has been provided to you  We will notify of test results once available and make treatment recommendations accordingly   Discussed recommendation for Shingrix vaccination, which patient can obtain if he chooses         Relevant Orders    Lipid Panel    Prostate Specific Antigen    TSH with reflex to Free T4 if abnormal    Comprehensive Metabolic Panel    CBC and Auto Differential    HIV 1/2 Antigen/Antibody Screen with Reflex to Confirmation    Hepatitis C Antibody    ECG 12 lead (Completed)    POCT UA (nonautomated) manually resulted    Prostate cancer screening     Routine health maintenance PSA screening collected today         Relevant Orders    Prostate Specific Antigen    Encounter for screening for HIV    Relevant Orders    HIV 1/2 Antigen/Antibody Screen with Reflex to Confirmation    Cyst of left knee joint     Likely a ganglion cyst that will need further evaluation from ortho         Relevant Orders    Referral to Orthopaedic Surgery    Chronic pain of left knee    Numbness of lip     Encouraged Shingles vaccine, but no other treatment at this time as condition has resolved          Other Visit Diagnoses       Need for hepatitis C screening test        Relevant Orders    Hepatitis C Antibody    Medication management        Relevant Orders    Drug Screen, Urine With Reflex to Confirmation

## 2023-08-17 NOTE — ASSESSMENT & PLAN NOTE
Encouraged Shingles vaccine, but no other treatment at this time as condition has resolved    Uncertain as the etiology, so if it recurs, let me know

## 2023-08-21 LAB
AMPHETAMINES,URINE: 1919 NG/ML
MDA,URINE: <200 NG/ML
MDEA,URINE: <200 NG/ML
MDMA,UR: <200 NG/ML
METHAMPHETAMINE QUANTITATIVE URINE: <200 NG/ML
PHENTERMINE,UR: <200 NG/ML

## 2023-08-30 ENCOUNTER — OFFICE VISIT (OUTPATIENT)
Dept: PRIMARY CARE | Facility: CLINIC | Age: 53
End: 2023-08-30
Payer: COMMERCIAL

## 2023-08-30 VITALS
HEART RATE: 70 BPM | BODY MASS INDEX: 29.03 KG/M2 | SYSTOLIC BLOOD PRESSURE: 138 MMHG | WEIGHT: 220 LBS | DIASTOLIC BLOOD PRESSURE: 90 MMHG

## 2023-08-30 DIAGNOSIS — M51.36 BULGING LUMBAR DISC: ICD-10-CM

## 2023-08-30 DIAGNOSIS — M51.16 LUMBAR DISC HERNIATION WITH RADICULOPATHY: Primary | ICD-10-CM

## 2023-08-30 DIAGNOSIS — M47.816 FACET ARTHROPATHY, LUMBAR: ICD-10-CM

## 2023-08-30 DIAGNOSIS — M51.36 DEGENERATION OF INTERVERTEBRAL DISC OF LUMBAR REGION: ICD-10-CM

## 2023-08-30 PROBLEM — M51.369 BULGING LUMBAR DISC: Status: ACTIVE | Noted: 2023-08-30

## 2023-08-30 PROCEDURE — 99215 OFFICE O/P EST HI 40 MIN: CPT | Performed by: FAMILY MEDICINE

## 2023-08-30 PROCEDURE — 3080F DIAST BP >= 90 MM HG: CPT | Performed by: FAMILY MEDICINE

## 2023-08-30 PROCEDURE — 1036F TOBACCO NON-USER: CPT | Performed by: FAMILY MEDICINE

## 2023-08-30 PROCEDURE — 3075F SYST BP GE 130 - 139MM HG: CPT | Performed by: FAMILY MEDICINE

## 2023-08-30 RX ORDER — GABAPENTIN 300 MG/1
CAPSULE ORAL
COMMUNITY
Start: 2023-08-23

## 2023-08-30 RX ORDER — KETOROLAC TROMETHAMINE 10 MG/1
TABLET, FILM COATED ORAL
COMMUNITY
Start: 2023-08-23

## 2023-08-30 RX ORDER — TIZANIDINE 4 MG/1
4 TABLET ORAL 3 TIMES DAILY
COMMUNITY
Start: 2023-08-24 | End: 2023-12-13

## 2023-08-30 RX ORDER — PREDNISONE 10 MG/1
TABLET ORAL
Qty: 30 TABLET | Refills: 0 | Status: SHIPPED | OUTPATIENT
Start: 2023-08-30 | End: 2023-09-10

## 2023-08-30 ASSESSMENT — PATIENT HEALTH QUESTIONNAIRE - PHQ9
1. LITTLE INTEREST OR PLEASURE IN DOING THINGS: NOT AT ALL
SUM OF ALL RESPONSES TO PHQ9 QUESTIONS 1 AND 2: 0
2. FEELING DOWN, DEPRESSED OR HOPELESS: NOT AT ALL

## 2023-08-30 NOTE — ASSESSMENT & PLAN NOTE
I suggested a comprehensive team approach that includes physical therapy to further evaluate and treat  Prednisone taper over the next 12 days to help alleviate not only the inflammation but the subsequent pain that is triggered by the inflammation  Appropriate office visits in follow-up as necessary  We are going to arrange for the appropriate referrals including pain management and spine surgery

## 2023-08-30 NOTE — ASSESSMENT & PLAN NOTE
I suggested a comprehensive team approach that includes physical therapy to further evaluate and treat  Prednisone taper over the next 12 days to help alleviate not only the inflammation but the subsequent pain that is triggered by the inflammation  Appropriate office visits in follow-up as necessary  We are going to arrange for the appropriate referrals including pain management and spine surgery    I suspect that a possible microdiscectomy may be needed where the disc is herniated itself especially triggering the pain down the left lower extremity  Please contact your HR department to get me your disability paperwork

## 2023-08-30 NOTE — PROGRESS NOTES
Subjective   Patient ID: Leo Lambert is a 53 y.o. male who presents for Back Pain.    CHRISTINE Bailey was admitted to the hospital from August 22 to August 23 due to a lumbar spine herniated disc.    The onset of this condition occurred while engaging in yard work, leading to a back spasm that progressively intensified throughout the day. This escalating discomfort hindered his ability to walk and induced nausea. Subsequently, he sought help from a chiropractor after work. An X-ray was conducted, revealing spinal narrowing and signs of arthritis. Notably, he possesses an additional L6 vertebrae. Following a manipulation session at the chiropractor's, he experienced excruciating pain radiating down his left leg. In response to this severe pain, he contacted emergency services during the night and was transported to [facility], where he remained hospitalized for a day.    During his hospitalization, an MRI unveiled the presence of three bulging discs, with sciatic impingement at the L4-S1 levels.  At L4/L5 there was a lateral disc extrusion and at L5/S1 small central disc extrusion.  It's important to note that he has an extra L6 vertebrae, contributing to the affected area. An epidural steroid shot was administered by Dr. Mccarty on August 22 to alleviate his symptoms. Medications prescribed at discharge included gabapentin 300 mg, tizanidine, toradol, and a Lidocaine patch.    However, despite these efforts, the discharge documentation did not include the expected referrals to spine specialists, physical therapy, or the appropriate [id number] form. Regrettably, Dr. Mccarty has been unavailable due to being out of town, resulting in a lack of responsiveness to address these discrepancies.    The patient ceased taking the prescribed medications two days ago and has noted a resurgence of pain and spasms. Consequently, he is seeking a medical note to support his application for short-term disability.  Furthermore, he requires a work form that specifies his incapacity to return to work. 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      Additionally, the patient is requesting referrals to specialists in pain management, physical therapy, and a spine evaluation, as needed, to ensure comprehensive and effective rehabilitation.    Review of Systems  All pertinent positive symptoms are included in the history of present illness.    All other systems have been reviewed and are negative and noncontributory to this patient's current ailments.    No Known Allergies      There is no immunization history on file for this patient.    Objective   Visit Vitals  /90   Pulse 70   Wt 99.8 kg (220 lb)   BMI 29.03 kg/m²   Smoking Status Never   BSA 2.27 m²       Physical Exam  CONSTITUTIONAL - well nourished, well developed, looks like stated age, in no acute distress, not ill-appearing, and not tired appearing  SKIN - normal skin color and pigmentation, normal skin turgor without rash, lesions, or nodules visualized  HEAD - no trauma, normocephalic  EYES - normal external exam  CHEST - no distressed breathing, good effort  EXTREMITIES - no edema, no deformities  NEUROLOGICAL - normal balance, normal motor, no ataxia, positive straight leg raising on the left  PSYCHIATRIC - alert, pleasant and cordial, age-appropriate     Assessment/Plan   Problem List Items Addressed This Visit       Degeneration of intervertebral disc of lumbar region     I would anticipate no work for at least the next 4 weeks as you go through not only therapy but also specialty evaluation    You will need to avoid prolonged standing, heavy lifting, and spinal twisting for the next several weeks         Relevant Medications    predniSONE (Deltasone) 10 mg  tablet    Facet arthropathy, lumbar    Relevant Medications    predniSONE (Deltasone) 10 mg tablet    Lumbar disc herniation with radiculopathy - Primary     I suggested a comprehensive team approach that includes physical therapy to further evaluate and treat  Prednisone taper over the next 12 days to help alleviate not only the inflammation but the subsequent pain that is triggered by the inflammation  Appropriate office visits in follow-up as necessary  We are going to arrange for the appropriate referrals including pain management and spine surgery    I suspect that a possible microdiscectomy may be needed where the disc is herniated itself especially triggering the pain down the left lower extremity  Please contact your HR department to get me your disability paperwork         Relevant Medications    predniSONE (Deltasone) 10 mg tablet    Other Relevant Orders    Referral to Physical Therapy    Referral to Spine Surgery    Referral to Pain Medicine    Bulging lumbar disc     I suggested a comprehensive team approach that includes physical therapy to further evaluate and treat  Prednisone taper over the next 12 days to help alleviate not only the inflammation but the subsequent pain that is triggered by the inflammation  Appropriate office visits in follow-up as necessary  We are going to arrange for the appropriate referrals including pain management and spine surgery         Relevant Medications    predniSONE (Deltasone) 10 mg tablet

## 2023-08-30 NOTE — ASSESSMENT & PLAN NOTE
I would anticipate no work for at least the next 4 weeks as you go through not only therapy but also specialty evaluation    You will need to avoid prolonged standing, heavy lifting, and spinal twisting for the next several weeks

## 2023-09-21 ENCOUNTER — OFFICE VISIT (OUTPATIENT)
Dept: PRIMARY CARE | Facility: CLINIC | Age: 53
End: 2023-09-21
Payer: COMMERCIAL

## 2023-09-21 VITALS
DIASTOLIC BLOOD PRESSURE: 90 MMHG | HEART RATE: 70 BPM | BODY MASS INDEX: 29.82 KG/M2 | SYSTOLIC BLOOD PRESSURE: 146 MMHG | WEIGHT: 226 LBS

## 2023-09-21 DIAGNOSIS — M51.16 LUMBAR DISC HERNIATION WITH RADICULOPATHY: Primary | ICD-10-CM

## 2023-09-21 PROBLEM — R42 EPISODIC LIGHTHEADEDNESS: Status: ACTIVE | Noted: 2023-09-21

## 2023-09-21 PROBLEM — M79.644 PAIN OF FINGER OF RIGHT HAND: Status: ACTIVE | Noted: 2023-09-21

## 2023-09-21 PROCEDURE — 1036F TOBACCO NON-USER: CPT | Performed by: FAMILY MEDICINE

## 2023-09-21 PROCEDURE — 3080F DIAST BP >= 90 MM HG: CPT | Performed by: FAMILY MEDICINE

## 2023-09-21 PROCEDURE — 99214 OFFICE O/P EST MOD 30 MIN: CPT | Performed by: FAMILY MEDICINE

## 2023-09-21 PROCEDURE — 3077F SYST BP >= 140 MM HG: CPT | Performed by: FAMILY MEDICINE

## 2023-09-21 ASSESSMENT — PATIENT HEALTH QUESTIONNAIRE - PHQ9
SUM OF ALL RESPONSES TO PHQ9 QUESTIONS 1 AND 2: 0
2. FEELING DOWN, DEPRESSED OR HOPELESS: NOT AT ALL
1. LITTLE INTEREST OR PLEASURE IN DOING THINGS: NOT AT ALL

## 2023-09-21 NOTE — ASSESSMENT & PLAN NOTE
We engaged in a thorough discussion regarding the pros and cons of conservative versus surgical management for your condition. I have had a longstanding professional relationship with Dr. Dorantes, who has been caring for many of my patients over the years. I hold his judgment in high regard, which is why I referred you to him.    Based on the symptomatology outlined in your History of Present Illness (HPI), I would recommend considering a microdiscectomy rather than continuing with prolonged physical therapy. Despite your participation in therapy sessions, an epidural block, and various medications, including a prednisone taper, it appears that you continue to experience not only back pain but also radicular symptoms. If your primary objective is a speedy recovery and a edouard return to work, surgical intervention seems to be the best course of action.    In the meantime, it's essential to be aware that your current disability policy will  on . Given the likelihood of needing several weeks off following surgery, it's crucial to plan accordingly. The exact timing of the surgery is still pending, so I am tentatively extending your disability leave until . However, the duration may be adjusted by Dr. Dorantes based on your specific surgical needs and recovery timeframe.

## 2023-09-21 NOTE — PROGRESS NOTES
Subjective   Patient ID: Leo Lambert is a 53 y.o. male who presents for Follow-up (MRI of the back).    HPI  During Leo' last visit on August 30, 2023, a comprehensive approach was suggested, which included physical therapy evaluation and treatment, a prednisone taper spanning 12 days, and paperwork for the company's [employer] department. Additionally, referrals were made to pain management and spine surgery specialists.    Today, Leo is here to discuss the results of his orthopedic visits along with his recent MRI, which revealed the following findings:    L2-L3: Minimal disc bulge, with no spinal canal or foraminal narrowing.  L3-L4: Minimal disc bulge, with no spinal canal or foraminal narrowing.  L4-L5: Disc bulge, lateral disc extrusion, bilateral facet osteophytes, with no spinal canal narrowing, and moderate left foraminal narrowing (right is unaffected).  L5-S1: Disc bulge, small central disc extrusion, bilateral facet osteophytes, with no spinal canal or foraminal narrowing.    Leo is interested in discussing the possibility of surgery. He has already consulted with Dr. Dorantes in spine surgery, who agreed that he would be a suitable candidate for a microdiscectomy but based on a shared decision, recommended trying physical therapy first to assess tolerance.     Leo attended one physical therapy session before seeing the orthopedic specialist. However, no exercises were pursued until the MRI results were discussed with the orthopedic specialist. Leo now has a physical therapy appointment scheduled for next week, following clearance from the orthopedic specialist. He mentioned that the PT's schedule is quite full, with appointments scheduled a month apart. He is open to exploring other physical therapy options but acknowledged that his current PT is knowledgeable and makes him feel comfortable.    Presently, Leo experiences limitations in various activities and spends most of his time at home  in bed. He noted a decrease in back spasms but still experiences tightness and a pinching sensation in his back. Additionally, he continues to feel numbness and tingling (sciatic symptoms) in his left lower extremity when in certain positions, making it very difficult to ambulate, sleep, and function throughout the day. Leo denied experiencing weakness, bowel and bladder incontinence, or cardiopulmonary symptoms.    For pain management, Leo is using Toradol, muscle relaxers, and lidocaine patches.    Furthermore, Leo shared that his left knee pain has been a concern since he has been unable to work or engage in activities. He received a second opinion, which indicated a need for a knee replacement. Additionally, cortisone shots were recommended for arthritis in his hands.    Review of Systems  All pertinent positive symptoms are included in the history of present illness.    All other systems have been reviewed and are negative and noncontributory to this patient's current ailments.    No Known Allergies      There is no immunization history on file for this patient.    Objective   Visit Vitals  /90   Pulse 70   Wt 103 kg (226 lb)   BMI 29.82 kg/m²   Smoking Status Never   BSA 2.3 m²       Physical Exam  CONSTITUTIONAL - well nourished, well developed, looks like stated age, in no acute distress, not ill-appearing, and not tired appearing  SKIN - normal skin color and pigmentation, normal skin turgor without rash, lesions, or nodules visualized  HEAD - no trauma, normocephalic  EYES - normal external exam  NECK - supple without rigidity, no neck mass was observed,  CHEST - clear to auscultation, no wheezing, no crackles and no rales, good effort  CARDIAC - regular rate and regular rhythm, no skipped beats, no murmur  NEUROLOGICAL - alert, oriented and no focal signs  PSYCHIATRIC - alert, pleasant and cordial, age-appropriate  IMMUNOLOGIC - no cervical lymphadenopathy     Assessment/Plan   Problem List  Items Addressed This Visit       Lumbar disc herniation with radiculopathy - Primary     We engaged in a thorough discussion regarding the pros and cons of conservative versus surgical management for your condition. I have had a longstanding professional relationship with Dr. Dorantes, who has been caring for many of my patients over the years. I hold his judgment in high regard, which is why I referred you to him.    Based on the symptomatology outlined in your History of Present Illness (HPI), I would recommend considering a microdiscectomy rather than continuing with prolonged physical therapy. Despite your participation in therapy sessions, an epidural block, and various medications, including a prednisone taper, it appears that you continue to experience not only back pain but also radicular symptoms. If your primary objective is a speedy recovery and a edouard return to work, surgical intervention seems to be the best course of action.    In the meantime, it's essential to be aware that your current disability policy will  on . Given the likelihood of needing several weeks off following surgery, it's crucial to plan accordingly. The exact timing of the surgery is still pending, so I am tentatively extending your disability leave until . However, the duration may be adjusted by Dr. Dorantes based on your specific surgical needs and recovery timeframe.          Current Outpatient Medications   Medication Instructions    amLODIPine (NORVASC) 10 mg, oral, Daily, TAKE 1 TABLET(5 MG) BY MOUTH EVERY DAY AS DIRECTED    amphetamine-dextroamphetamine (Adderall) 10 mg tablet 10 mg, oral, Daily    amphetamine-dextroamphetamine (Adderall) 10 mg tablet 10 mg, oral, Daily    [START ON 10/16/2023] amphetamine-dextroamphetamine (Adderall) 10 mg tablet 10 mg, oral, Daily    amphetamine-dextroamphetamine XR (Adderall XR) 20 mg 24 hr capsule 20 mg, oral, Every morning, Do not crush or chew.     amphetamine-dextroamphetamine XR (Adderall XR) 20 mg 24 hr capsule 20 mg, oral, Every morning, Do not crush or chew.    [START ON 10/16/2023] amphetamine-dextroamphetamine XR (Adderall XR) 20 mg 24 hr capsule 20 mg, oral, Every morning, Do not crush or chew.    gabapentin (Neurontin) 300 mg capsule     ketorolac (Toradol) 10 mg tablet TAKE 1 TABLET BY MOUTH FOUR TIMES DAILY AS NEEDED FOR MODERATE TO SEVERE PAIN    meloxicam (Mobic) 7.5 mg tablet 1 tablet, oral, 2 times daily PRN    tadalafil (CIALIS) 20 mg, oral, Daily PRN    tiZANidine (ZANAFLEX) 4 mg, oral, 3 times daily

## 2023-10-03 ENCOUNTER — TREATMENT (OUTPATIENT)
Dept: PHYSICAL THERAPY | Facility: CLINIC | Age: 53
End: 2023-10-03
Payer: COMMERCIAL

## 2023-10-03 DIAGNOSIS — M51.16 LUMBAR DISC HERNIATION WITH RADICULOPATHY: Primary | ICD-10-CM

## 2023-10-03 PROCEDURE — 97110 THERAPEUTIC EXERCISES: CPT | Mod: GP

## 2023-10-03 NOTE — PROGRESS NOTES
*NOTE: Documentation may appear different than previous visits. Plan of care was established for patient within [facility] prior to transition to [facility]. Any omitted documentation may be located within previous documentation within [facility] system.     Physical Therapy Treatment    Patient Name: Leo Lambert  MRN: [id number]  Today's Date: 10/3/2023   Visit #3      SUBJECTIVE:  Pt with more pain centered in the back and buttock. Does still have leg pain. Is still considering his options surgically and non surgically.     OBJECTIVE:  No provocation of symptoms with LB stretch and stabilization program today.     TREATMENT:  - Therex: 38 min   SOFIA x 10   SKTC, LTR, DKTC   Piriformis stretch     X 15 sofia   PT   Adduction with PT  Abduction Green TB with PT   S/l clamshells     Prone resting  PPU with overpressure from elbows     - Manual Therapy: 5 min.   BRIAN delatorre, assisted stretches     - - Modalities:    CP x 10 min supine     ASSESSMENT:   PT is able to complete a stabilization program for the DLS well. Pt continues to have soft tissue restriction and tightness in the low back. Pt is doing well with a neutral program. Pt with tightness in the low back. Continues to have back pain worse than leg pain at this point in his care.     PLAN:    Continue with POC per written chart[facility], and progress toward goals.

## 2023-10-13 ENCOUNTER — APPOINTMENT (OUTPATIENT)
Dept: PHYSICAL THERAPY | Facility: CLINIC | Age: 53
End: 2023-10-13
Payer: COMMERCIAL

## 2023-10-17 ENCOUNTER — TREATMENT (OUTPATIENT)
Dept: PHYSICAL THERAPY | Facility: CLINIC | Age: 53
End: 2023-10-17
Payer: COMMERCIAL

## 2023-10-17 DIAGNOSIS — M51.16 LUMBAR DISC HERNIATION WITH RADICULOPATHY: Primary | ICD-10-CM

## 2023-10-17 PROCEDURE — 97110 THERAPEUTIC EXERCISES: CPT | Mod: GP

## 2023-10-17 NOTE — PROGRESS NOTES
*NOTE: Documentation may appear different than previous visits. Plan of care was established for patient within [facility] prior to transition to [facility]. Any omitted documentation may be located within [facility].     Physical Therapy Treatment    Patient Name: Leo Lambert  MRN: [id number]  Today's Date: 10/17/2023   Visit #4      SUBJECTIVE:  Overall feels better. Continues to have stiffness/tightness in the back. Has some issues down the left leg but overall less. The intensity of the symptoms has reduced.     OBJECTIVE:    TREATMENT:  - Therex: 38 min   SOFIA x 10   SKTC, LTR, DKTC   Piriformis stretch     X 15 sofia   PT   Adduction with PT  Abduction Green TB with PT   S/l clamshells   Reverse clams     Prone resting  PPU with overpressure from elbows     - Manual Therapy: 5 min.   PA juan ramon, assisted stretches     - - Modalities:    CP x 10 min supine declined today     ASSESSMENT:   Pt nick well with no increased symptoms or pain. Able to progress with DLS and hip strength. Pt iwht improved lumbar extension. Improved overall. Positive response to care.   PLAN:    Continue with POC per written chart, [employer] previous [facility], and progress toward goals.

## 2023-10-31 DIAGNOSIS — R39.14 BENIGN PROSTATIC HYPERPLASIA WITH INCOMPLETE BLADDER EMPTYING: Primary | ICD-10-CM

## 2023-10-31 DIAGNOSIS — N40.1 BENIGN PROSTATIC HYPERPLASIA WITH INCOMPLETE BLADDER EMPTYING: Primary | ICD-10-CM

## 2023-10-31 RX ORDER — TAMSULOSIN HYDROCHLORIDE 0.4 MG/1
0.4 CAPSULE ORAL DAILY
Qty: 90 CAPSULE | Refills: 0 | Status: SHIPPED | OUTPATIENT
Start: 2023-10-31 | End: 2024-01-18

## 2023-11-06 RX ORDER — KETOROLAC TROMETHAMINE 10 MG/1
TABLET, FILM COATED ORAL
Qty: 20 TABLET | Refills: 0 | OUTPATIENT
Start: 2023-11-06

## 2023-11-06 NOTE — TELEPHONE ENCOUNTER
This is not an appropriate medication to be on long-term, as it has significant potential consequences in the gastric lining.  He and I have talked about this in the office before

## 2023-11-28 ENCOUNTER — DOCUMENTATION (OUTPATIENT)
Dept: PHYSICAL THERAPY | Facility: CLINIC | Age: 53
End: 2023-11-28
Payer: COMMERCIAL

## 2023-11-28 NOTE — PROGRESS NOTES
Physical Therapy    Discharge Summary    Name: Leo Lambert  MRN: [id number]  : 1970  Date: 23    Discharge Summary: PT    Discharge Information: Date of last visit 10/17/23    Therapy Summary: see last note     Discharge Status: discharged      Rehab Discharge Reason: Other pt to follow up with diagnostics and MD

## 2023-12-13 DIAGNOSIS — M17.32 POST-TRAUMATIC OSTEOARTHRITIS OF LEFT KNEE: Primary | ICD-10-CM

## 2023-12-13 RX ORDER — TIZANIDINE 4 MG/1
4 TABLET ORAL 3 TIMES DAILY
Qty: 90 TABLET | Refills: 11 | Status: SHIPPED | OUTPATIENT
Start: 2023-12-13

## 2024-01-17 DIAGNOSIS — N40.1 BENIGN PROSTATIC HYPERPLASIA WITH INCOMPLETE BLADDER EMPTYING: ICD-10-CM

## 2024-01-17 DIAGNOSIS — R39.14 BENIGN PROSTATIC HYPERPLASIA WITH INCOMPLETE BLADDER EMPTYING: ICD-10-CM

## 2024-01-18 RX ORDER — TAMSULOSIN HYDROCHLORIDE 0.4 MG/1
0.4 CAPSULE ORAL DAILY
Qty: 90 CAPSULE | Refills: 1 | Status: SHIPPED | OUTPATIENT
Start: 2024-01-18 | End: 2024-01-23 | Stop reason: SDUPTHER

## 2024-01-23 ENCOUNTER — OFFICE VISIT (OUTPATIENT)
Dept: PRIMARY CARE | Facility: CLINIC | Age: 54
End: 2024-01-23
Payer: COMMERCIAL

## 2024-01-23 VITALS
DIASTOLIC BLOOD PRESSURE: 90 MMHG | HEIGHT: 71 IN | BODY MASS INDEX: 31.92 KG/M2 | WEIGHT: 228 LBS | SYSTOLIC BLOOD PRESSURE: 150 MMHG | HEART RATE: 68 BPM

## 2024-01-23 DIAGNOSIS — N40.1 BENIGN PROSTATIC HYPERPLASIA WITH INCOMPLETE BLADDER EMPTYING: ICD-10-CM

## 2024-01-23 DIAGNOSIS — F90.1 ADHD, HYPERACTIVE-IMPULSIVE TYPE: ICD-10-CM

## 2024-01-23 DIAGNOSIS — I10 PRIMARY HYPERTENSION: Primary | ICD-10-CM

## 2024-01-23 DIAGNOSIS — R39.14 BENIGN PROSTATIC HYPERPLASIA WITH INCOMPLETE BLADDER EMPTYING: ICD-10-CM

## 2024-01-23 DIAGNOSIS — G47.33 OBSTRUCTIVE SLEEP APNEA, ADULT: ICD-10-CM

## 2024-01-23 PROCEDURE — 3077F SYST BP >= 140 MM HG: CPT | Performed by: FAMILY MEDICINE

## 2024-01-23 PROCEDURE — 1036F TOBACCO NON-USER: CPT | Performed by: FAMILY MEDICINE

## 2024-01-23 PROCEDURE — 3080F DIAST BP >= 90 MM HG: CPT | Performed by: FAMILY MEDICINE

## 2024-01-23 PROCEDURE — 99214 OFFICE O/P EST MOD 30 MIN: CPT | Performed by: FAMILY MEDICINE

## 2024-01-23 RX ORDER — AMLODIPINE BESYLATE 10 MG/1
10 TABLET ORAL DAILY
Qty: 90 TABLET | Refills: 1 | Status: SHIPPED | OUTPATIENT
Start: 2024-01-23 | End: 2024-05-29 | Stop reason: SDUPTHER

## 2024-01-23 RX ORDER — DEXTROAMPHETAMINE SACCHARATE, AMPHETAMINE ASPARTATE, DEXTROAMPHETAMINE SULFATE AND AMPHETAMINE SULFATE 2.5; 2.5; 2.5; 2.5 MG/1; MG/1; MG/1; MG/1
10 TABLET ORAL DAILY
Qty: 30 TABLET | Refills: 0 | Status: SHIPPED | OUTPATIENT
Start: 2024-03-23 | End: 2024-05-29 | Stop reason: SDUPTHER

## 2024-01-23 RX ORDER — DEXTROAMPHETAMINE SACCHARATE, AMPHETAMINE ASPARTATE MONOHYDRATE, DEXTROAMPHETAMINE SULFATE AND AMPHETAMINE SULFATE 5; 5; 5; 5 MG/1; MG/1; MG/1; MG/1
20 CAPSULE, EXTENDED RELEASE ORAL EVERY MORNING
Qty: 30 CAPSULE | Refills: 0 | Status: SHIPPED | OUTPATIENT
Start: 2024-03-23 | End: 2024-05-29 | Stop reason: SDUPTHER

## 2024-01-23 RX ORDER — DEXTROAMPHETAMINE SACCHARATE, AMPHETAMINE ASPARTATE MONOHYDRATE, DEXTROAMPHETAMINE SULFATE AND AMPHETAMINE SULFATE 5; 5; 5; 5 MG/1; MG/1; MG/1; MG/1
20 CAPSULE, EXTENDED RELEASE ORAL EVERY MORNING
Qty: 30 CAPSULE | Refills: 0 | Status: SHIPPED | OUTPATIENT
Start: 2024-01-23 | End: 2024-05-29 | Stop reason: SDUPTHER

## 2024-01-23 RX ORDER — DEXTROAMPHETAMINE SACCHARATE, AMPHETAMINE ASPARTATE, DEXTROAMPHETAMINE SULFATE AND AMPHETAMINE SULFATE 2.5; 2.5; 2.5; 2.5 MG/1; MG/1; MG/1; MG/1
10 TABLET ORAL DAILY
Qty: 30 TABLET | Refills: 0 | Status: SHIPPED | OUTPATIENT
Start: 2024-02-22 | End: 2024-05-29 | Stop reason: SDUPTHER

## 2024-01-23 RX ORDER — TAMSULOSIN HYDROCHLORIDE 0.4 MG/1
0.4 CAPSULE ORAL DAILY
Qty: 90 CAPSULE | Refills: 1 | Status: SHIPPED | OUTPATIENT
Start: 2024-01-23 | End: 2024-05-29 | Stop reason: SDUPTHER

## 2024-01-23 RX ORDER — DEXTROAMPHETAMINE SACCHARATE, AMPHETAMINE ASPARTATE, DEXTROAMPHETAMINE SULFATE AND AMPHETAMINE SULFATE 2.5; 2.5; 2.5; 2.5 MG/1; MG/1; MG/1; MG/1
10 TABLET ORAL DAILY
Qty: 30 TABLET | Refills: 0 | Status: SHIPPED | OUTPATIENT
Start: 2024-01-23 | End: 2024-05-29 | Stop reason: SDUPTHER

## 2024-01-23 RX ORDER — DEXTROAMPHETAMINE SACCHARATE, AMPHETAMINE ASPARTATE MONOHYDRATE, DEXTROAMPHETAMINE SULFATE AND AMPHETAMINE SULFATE 5; 5; 5; 5 MG/1; MG/1; MG/1; MG/1
20 CAPSULE, EXTENDED RELEASE ORAL EVERY MORNING
Qty: 30 CAPSULE | Refills: 0 | Status: SHIPPED | OUTPATIENT
Start: 2024-02-22 | End: 2024-05-29 | Stop reason: SDUPTHER

## 2024-01-23 NOTE — ASSESSMENT & PLAN NOTE
We discussed some of the [employer] eligibility criteria, including inability to tolerate CPAP. I have placed a referral to sleep medicine for you to discuss next steps

## 2024-01-23 NOTE — ASSESSMENT & PLAN NOTE
BP is uncontrolled here in the office secondary to running out of medication for the past 2 weeks  I would like to have you monitor and record blood pressures at home   Blood pressure goal should be below 130/80, ideally 120/80  If the blood pressure is too high, we need to consider making adjustments to your antihypertensive therapy

## 2024-05-29 ENCOUNTER — OFFICE VISIT (OUTPATIENT)
Dept: PRIMARY CARE | Facility: CLINIC | Age: 54
End: 2024-05-29
Payer: COMMERCIAL

## 2024-05-29 VITALS
DIASTOLIC BLOOD PRESSURE: 80 MMHG | HEIGHT: 71 IN | BODY MASS INDEX: 30.8 KG/M2 | WEIGHT: 220 LBS | HEART RATE: 72 BPM | SYSTOLIC BLOOD PRESSURE: 124 MMHG

## 2024-05-29 DIAGNOSIS — N40.1 BENIGN PROSTATIC HYPERPLASIA WITH NOCTURIA: ICD-10-CM

## 2024-05-29 DIAGNOSIS — R39.14 BENIGN PROSTATIC HYPERPLASIA WITH INCOMPLETE BLADDER EMPTYING: ICD-10-CM

## 2024-05-29 DIAGNOSIS — R35.1 BENIGN PROSTATIC HYPERPLASIA WITH NOCTURIA: ICD-10-CM

## 2024-05-29 DIAGNOSIS — G47.33 OBSTRUCTIVE SLEEP APNEA, ADULT: ICD-10-CM

## 2024-05-29 DIAGNOSIS — F90.1 ADHD, HYPERACTIVE-IMPULSIVE TYPE: ICD-10-CM

## 2024-05-29 DIAGNOSIS — N40.1 BENIGN PROSTATIC HYPERPLASIA WITH INCOMPLETE BLADDER EMPTYING: ICD-10-CM

## 2024-05-29 DIAGNOSIS — I10 PRIMARY HYPERTENSION: Primary | ICD-10-CM

## 2024-05-29 PROCEDURE — 3078F DIAST BP <80 MM HG: CPT | Performed by: FAMILY MEDICINE

## 2024-05-29 PROCEDURE — 99214 OFFICE O/P EST MOD 30 MIN: CPT | Performed by: FAMILY MEDICINE

## 2024-05-29 PROCEDURE — 3074F SYST BP LT 130 MM HG: CPT | Performed by: FAMILY MEDICINE

## 2024-05-29 PROCEDURE — 1036F TOBACCO NON-USER: CPT | Performed by: FAMILY MEDICINE

## 2024-05-29 RX ORDER — DEXTROAMPHETAMINE SACCHARATE, AMPHETAMINE ASPARTATE MONOHYDRATE, DEXTROAMPHETAMINE SULFATE AND AMPHETAMINE SULFATE 5; 5; 5; 5 MG/1; MG/1; MG/1; MG/1
20 CAPSULE, EXTENDED RELEASE ORAL EVERY MORNING
Qty: 30 CAPSULE | Refills: 0 | Status: SHIPPED | OUTPATIENT
Start: 2024-07-28 | End: 2024-08-27

## 2024-05-29 RX ORDER — DEXTROAMPHETAMINE SACCHARATE, AMPHETAMINE ASPARTATE MONOHYDRATE, DEXTROAMPHETAMINE SULFATE AND AMPHETAMINE SULFATE 5; 5; 5; 5 MG/1; MG/1; MG/1; MG/1
20 CAPSULE, EXTENDED RELEASE ORAL EVERY MORNING
Qty: 30 CAPSULE | Refills: 0 | Status: SHIPPED | OUTPATIENT
Start: 2024-05-29 | End: 2024-06-28

## 2024-05-29 RX ORDER — DEXTROAMPHETAMINE SACCHARATE, AMPHETAMINE ASPARTATE, DEXTROAMPHETAMINE SULFATE AND AMPHETAMINE SULFATE 2.5; 2.5; 2.5; 2.5 MG/1; MG/1; MG/1; MG/1
10 TABLET ORAL DAILY
Qty: 30 TABLET | Refills: 0 | Status: SHIPPED | OUTPATIENT
Start: 2024-07-28 | End: 2024-08-27

## 2024-05-29 RX ORDER — TAMSULOSIN HYDROCHLORIDE 0.4 MG/1
0.4 CAPSULE ORAL DAILY
Qty: 90 CAPSULE | Refills: 1 | Status: SHIPPED | OUTPATIENT
Start: 2024-05-29 | End: 2024-11-25

## 2024-05-29 RX ORDER — DEXTROAMPHETAMINE SACCHARATE, AMPHETAMINE ASPARTATE MONOHYDRATE, DEXTROAMPHETAMINE SULFATE AND AMPHETAMINE SULFATE 5; 5; 5; 5 MG/1; MG/1; MG/1; MG/1
20 CAPSULE, EXTENDED RELEASE ORAL EVERY MORNING
Qty: 30 CAPSULE | Refills: 0 | Status: SHIPPED | OUTPATIENT
Start: 2024-06-28 | End: 2024-07-28

## 2024-05-29 RX ORDER — DEXTROAMPHETAMINE SACCHARATE, AMPHETAMINE ASPARTATE, DEXTROAMPHETAMINE SULFATE AND AMPHETAMINE SULFATE 2.5; 2.5; 2.5; 2.5 MG/1; MG/1; MG/1; MG/1
10 TABLET ORAL DAILY
Qty: 30 TABLET | Refills: 0 | Status: SHIPPED | OUTPATIENT
Start: 2024-06-28 | End: 2024-07-28

## 2024-05-29 RX ORDER — DEXTROAMPHETAMINE SACCHARATE, AMPHETAMINE ASPARTATE, DEXTROAMPHETAMINE SULFATE AND AMPHETAMINE SULFATE 2.5; 2.5; 2.5; 2.5 MG/1; MG/1; MG/1; MG/1
10 TABLET ORAL DAILY
Qty: 30 TABLET | Refills: 0 | Status: SHIPPED | OUTPATIENT
Start: 2024-05-29 | End: 2024-06-28

## 2024-05-29 RX ORDER — AMLODIPINE BESYLATE 10 MG/1
10 TABLET ORAL DAILY
Qty: 90 TABLET | Refills: 1 | Status: SHIPPED | OUTPATIENT
Start: 2024-05-29 | End: 2024-11-25

## 2024-05-29 NOTE — PROGRESS NOTES
Subjective   Patient ID: Leo Lambert is a 54 y.o. male who presents for ADHD, Hypertension, Benign Prostatic Hypertrophy, and Sleep Apnea.    Past Medical, Surgical, and Family History reviewed and updated in chart.    Reviewed all medications by prescribing practitioner or clinical pharmacist (such as prescriptions, OTCs, herbal therapies and supplements) and documented in the medical record.    HPI  1.  ADHD.  Currently taking medication noted on the chart  Drug screen and controlled substance agreement done August 17, 2023  Doing well with no concerns, requesting refills today   Continues to allow him to focus and concentrate at work  Was very good about the dosing regimen right now  Takes Adderall XR in the morning, then he takes Adderall 10 mg in the afternoon around 2:00 PM or later  Adderall last filled on April 3, 2024 for 30 tablets    2.  Hypertension.  Currently taking amlodipine 10 mg daily  Went to visit his cousin in [place], noticed that after a car drive of 8 hours his ankles started to swell and had pitting edema until the following morning with recurrence daily, although never associated with any redness or pain  The lower extremity swelling is not bothersome, but wanted to point it out  Denies any chest pain, shortness of breath or any other cardiac/respiratory concerns    3.  BPH.  Currently taking tamsulosin 4mg daily, and definitely notices that his urine stream decreases when he does not take the medication  Would be interested in talking to urology about whether or not he would be a candidate for a procedure such as a HoLEP    4.  YULI.  Tested for sleep apnea 7+ years ago, but the test was very complicated, there were no masks that would fit him, they could not determine a proper pressure, so he never trialed the CPAP machine.  He has trialed other things such as OTC mouthguard's, dental mouthguard's, nasal strips, and no improvement.  In fact, he snores so loudly that when he goes on a  trip, his friends ask him to get a separate room.  Interested in talking to somebody about the Insp[facility] therapy as he has been experiencing increased fatigue and daytime sleepiness and feels it is likely due to his sleep apnea.  He meant to do this after his last visit, but his work schedule has not permitted him to do so.  He is hoping he can get a virtual visit to speak to a sleep medicine specialist about this concern.    Review of Systems  All pertinent positive symptoms are included in the history of present illness.    All other systems have been reviewed and are negative and noncontributory to this patient's current ailments.    Past Medical History:   Diagnosis Date    Attention-deficit hyperactivity disorder, unspecified type 12/23/2022    Attention deficit hyperactivity disorder    Essential (primary) hypertension 09/26/2022    Benign essential hypertension    Pain in left knee 10/15/2021    Chronic pain of left knee    Plantar fascial fibromatosis 06/05/2019    Plantar fasciitis, right    Sprain of anterior cruciate ligament of left knee, initial encounter 10/18/2021    Left ACL tear    Thalassemia minor 09/22/2015    Thalassemia trait     Past Surgical History:   Procedure Laterality Date    ANKLE ARTHROSCOPY W/ OPEN REPAIR  04/07/2014    Ankle Repair    HERNIA REPAIR  04/07/2014    Hernia Repair    HERNIA REPAIR  04/07/2014    Inguinal Hernia Repair    OTHER SURGICAL HISTORY  10/18/2021    Anterior cruciate ligament repair    OTHER SURGICAL HISTORY  10/18/2021    Orbital blowout fracture repair    OTHER SURGICAL HISTORY  10/18/2021    Colonoscopy    OTHER SURGICAL HISTORY  02/09/2015    Neuroplasty With Transposition Of Ulnar Nerve - At Elbow     Social History     Tobacco Use    Smoking status: Never     Passive exposure: Never    Smokeless tobacco: Never   Substance Use Topics    Alcohol use: Yes    Drug use: Never     Family History   Problem Relation Name Age of Onset    Anemia Mother      Other  "(CABG) Father      Dementia Father      Diabetes Father      Pancreatic cancer Father      Heart disease Other Grandparent     Dementia Other Grandparent     Pancreatic cancer Other Grandmother     Pancreatic cancer Other Aunt      Immunization History   Administered Date(s) Administered    Pfizer Purple Cap SARS-CoV-2 08/08/2021     Current Outpatient Medications   Medication Instructions    amLODIPine (NORVASC) 10 mg, oral, Daily, TAKE 1 TABLET(5 MG) BY MOUTH EVERY DAY AS DIRECTED    amphetamine-dextroamphetamine (Adderall) 10 mg tablet 10 mg, oral, Daily    [START ON 6/28/2024] amphetamine-dextroamphetamine (Adderall) 10 mg tablet 10 mg, oral, Daily    [START ON 7/28/2024] amphetamine-dextroamphetamine (Adderall) 10 mg tablet 10 mg, oral, Daily    amphetamine-dextroamphetamine XR (Adderall XR) 20 mg 24 hr capsule 20 mg, oral, Every morning, Do not crush or chew.    [START ON 6/28/2024] amphetamine-dextroamphetamine XR (Adderall XR) 20 mg 24 hr capsule 20 mg, oral, Every morning, Do not crush or chew.    [START ON 7/28/2024] amphetamine-dextroamphetamine XR (Adderall XR) 20 mg 24 hr capsule 20 mg, oral, Every morning, Do not crush or chew.    gabapentin (Neurontin) 300 mg capsule     ketorolac (Toradol) 10 mg tablet TAKE 1 TABLET BY MOUTH FOUR TIMES DAILY AS NEEDED FOR MODERATE TO SEVERE PAIN    meloxicam (Mobic) 7.5 mg tablet 1 tablet, oral, 2 times daily PRN    tadalafil (CIALIS) 20 mg, oral, Daily PRN    tamsulosin (FLOMAX) 0.4 mg, oral, Daily    tiZANidine (ZANAFLEX) 4 mg, oral, 3 times daily     No Known Allergies    Objective   Vitals:    05/29/24 0736 05/29/24 0803   BP: 120/76 124/80   Pulse: 72    Weight: 99.8 kg (220 lb)    Height: 1.803 m (5' 11\")      Body mass index is 30.68 kg/m².    BP Readings from Last 3 Encounters:   05/29/24 124/80   01/23/24 150/90   09/21/23 146/90      Wt Readings from Last 3 Encounters:   05/29/24 99.8 kg (220 lb)   01/23/24 103 kg (228 lb)   09/21/23 103 kg (226 lb)    "     No visits with results within 1 Month(s) from this visit.   Latest known visit with results is:   Office Visit on 08/17/2023   Component Date Value    POC Color, Urine 08/17/2023 Yellow     POC Appearance, Urine 08/17/2023 Clear     POC Specific Gravity, Ur* 08/17/2023 1.015     POC PH, Urine 08/17/2023 6.5     POC Protein, Urine 08/17/2023 NEGATIVE     POC Glucose, Urine 08/17/2023 NEGATIVE     POC Blood, Urine 08/17/2023 NEGATIVE     POC Ketones, Urine 08/17/2023 NEGATIVE     POC Bilirubin, Urine 08/17/2023 NEGATIVE     POC Urobilinogen, Urine 08/17/2023 0.2     Poc Nitrite, Urine 08/17/2023 NEGATIVE     POC Leukocytes, Urine 08/17/2023 NEGATIVE     DRUG SCREEN COMMENT URINE 08/17/2023 SEE BELOW     Amphetamine Screen, Urine 08/17/2023 PRESUMPTIVE POSITIVE (A)     Barbiturate Screen, Urine 08/17/2023 PRESUMPTIVE NEGATIVE     BENZODIAZEPINE (PRESENCE* 08/17/2023 PRESUMPTIVE NEGATIVE     Cannabinoid Screen, Urine 08/17/2023 PRESUMPTIVE NEGATIVE     Cocaine Screen, Urine 08/17/2023 PRESUMPTIVE NEGATIVE     Fentanyl, Ur 08/17/2023 PRESUMPTIVE NEGATIVE     Methadone Screen, Urine 08/17/2023 PRESUMPTIVE NEGATIVE     Opiate Screen, Urine 08/17/2023 PRESUMPTIVE NEGATIVE     Oxycodone Screen, Ur 08/17/2023 PRESUMPTIVE NEGATIVE     PCP Screen, Urine 08/17/2023 PRESUMPTIVE NEGATIVE     Methamphetamine Quant, Ur 08/17/2023 <200     MDA, Urine 08/17/2023 <200     MDEA, Urine 08/17/2023 <200     Phentermine,Urine 08/17/2023 <200     Amphetamines,Urine 08/17/2023 1919     MDMA, Urine 08/17/2023 <200      Physical Exam  CONSTITUTIONAL - well nourished, well developed, looks like stated age, in no acute distress, not ill-appearing, and not tired appearing  SKIN - normal skin color and pigmentation, normal skin turgor without rash, lesions, or nodules visualized  HEAD - no trauma, normocephalic  EYES - pupils are equal and reactive to light, extraocular muscles are intact, and normal external exam  CHEST - clear to  auscultation, no wheezing, no crackles and no rales, good effort  CARDIAC - regular rate and regular rhythm, no skipped beats, no murmur  EXTREMITIES - no obvious or evident edema, no obvious or evident deformities  NEUROLOGICAL - normal gait, normal balance, normal motor, no ataxia, alert, oriented and no focal signs  PSYCHIATRIC - alert, pleasant and cordial, age-appropriate    Assessment/Plan   Problem List Items Addressed This Visit       ADHD, hyperactive-impulsive type     Stable, continue medications as prescribed.         Relevant Medications    amphetamine-dextroamphetamine (Adderall) 10 mg tablet    amphetamine-dextroamphetamine (Adderall) 10 mg tablet (Start on 6/28/2024)    amphetamine-dextroamphetamine (Adderall) 10 mg tablet (Start on 7/28/2024)    amphetamine-dextroamphetamine XR (Adderall XR) 20 mg 24 hr capsule    amphetamine-dextroamphetamine XR (Adderall XR) 20 mg 24 hr capsule (Start on 6/28/2024)    amphetamine-dextroamphetamine XR (Adderall XR) 20 mg 24 hr capsule (Start on 7/28/2024)    Primary hypertension - Primary     Stable, no changes to medication recommended  I would like to have you monitor and record blood pressures at home   Blood pressure goal should be below 130/80, ideally 120/80  If the blood pressure is too high, we need to consider making adjustments to your antihypertensive therapy         Relevant Medications    amLODIPine (Norvasc) 10 mg tablet    BPH (benign prostatic hyperplasia)     Stable, continue medications as prescribed    Due to the fact that your symptoms recur as soon as you stop taking tamsulosin, we made a shared decision to have you seen by urology to further discuss candidacy for a procedure         Relevant Medications    tamsulosin (Flomax) 0.4 mg 24 hr capsule    Other Relevant Orders    Referral to Urology    Obstructive sleep apnea, adult     We discussed some of the [facility] eligibility criteria, including inability to tolerate CPAP  I have placed a  referral to sleep medicine for you to discuss next steps         Relevant Orders    Referral to Adult Sleep Medicine

## 2024-05-29 NOTE — ASSESSMENT & PLAN NOTE
Stable, continue medications as prescribed    Due to the fact that your symptoms recur as soon as you stop taking tamsulosin, we made a shared decision to have you seen by urology to further discuss candidacy for a procedure

## 2024-05-29 NOTE — ASSESSMENT & PLAN NOTE
We discussed some of the [facility] eligibility criteria, including inability to tolerate CPAP  I have placed a referral to sleep medicine for you to discuss next steps

## 2024-05-29 NOTE — ASSESSMENT & PLAN NOTE
Stable, no changes to medication recommended  I would like to have you monitor and record blood pressures at home   Blood pressure goal should be below 130/80, ideally 120/80  If the blood pressure is too high, we need to consider making adjustments to your antihypertensive therapy

## 2024-07-22 DIAGNOSIS — I10 PRIMARY HYPERTENSION: Primary | ICD-10-CM

## 2024-07-22 RX ORDER — LISINOPRIL 20 MG/1
20 TABLET ORAL DAILY
Qty: 30 TABLET | Refills: 0 | Status: SHIPPED | OUTPATIENT
Start: 2024-07-22 | End: 2024-08-21

## 2024-08-22 DIAGNOSIS — I10 PRIMARY HYPERTENSION: ICD-10-CM

## 2024-08-23 RX ORDER — LISINOPRIL 20 MG/1
20 TABLET ORAL DAILY
Qty: 30 TABLET | Refills: 0 | OUTPATIENT
Start: 2024-08-23

## 2024-08-27 RX ORDER — LISINOPRIL 20 MG/1
20 TABLET ORAL DAILY
Qty: 90 TABLET | Refills: 0 | Status: SHIPPED | OUTPATIENT
Start: 2024-08-27 | End: 2024-11-25

## 2024-10-15 NOTE — PROGRESS NOTES
Subjective   Patient ID: Leo Lambert is a 54 y.o. male who presents for ADHD, Annual Exam, Hypertension, and Benign Prostatic Hypertrophy.    Past Medical, Surgical, and Family History reviewed and updated in chart.    Reviewed all medications by prescribing practitioner or clinical pharmacist (such as prescriptions, OTCs, herbal therapies and supplements) and documented in the medical record.    HPI  1.  Physical exam.  Colonoscopy: last done April 2016 with 10 year recommended   Immunizations: Tdap 2016, needs all other vaccines updated, but not interested  Sees dentist and eye doctor regularly   Diet: follows no specific diet   Exercise: Occasional  EtOH use: occasional, a couple drinks per week   Never smoker     2.  ADHD.  Currently taking medication noted on the chart  Adderall 10 mg last filled on August 16, Adderall XR last refilled on August 14, both for 30 tablets  Controlled substance agreement and drug screen both need to be updated, last done August 2023  Doing well with no concerns, requesting refills today   Continues to allow him to focus and concentrate at work  Takes Adderall XR in the morning, then he takes Adderall 10 mg in the afternoon around 2:00 PM or later     3.  Hypertension.  Currently taking lisinopril 20 mg daily, no longer taking amlodipine  No ACS symptoms     4.  BPH.  Currently taking tamsulosin 4mg daily, and definitely notices that his urine stream decreases when he does not take the medication     5.  YULI.  Tested for sleep apnea 8+ years ago, but the test was very complicated, there were no masks that would fit him, they could not determine a proper pressure, so he never trialed the CPAP machine.  He has trialed other things such as OTC mouthguard's, dental mouthguard's, nasal strips, and no improvement.  In fact, he snores so loudly that when he goes on a trip, his friends ask him to get a separate room.  Interested in talking to somebody about the [facility] therapy as he has  been experiencing increased fatigue and daytime sleepiness and feels it is likely due to his sleep apnea.  He meant to do this after his last visit, but his work schedule has not permitted him to do so.  He is hoping he can get a virtual visit to speak to a sleep medicine specialist about this concern.    Review of Systems  All pertinent positive symptoms are included in the history of present illness.    All other systems have been reviewed and are negative and noncontributory to this patient's current ailments.    Past Medical History:   Diagnosis Date    Attention-deficit hyperactivity disorder, unspecified type 12/23/2022    Attention deficit hyperactivity disorder    Essential (primary) hypertension 09/26/2022    Benign essential hypertension    Pain in left knee 10/15/2021    Chronic pain of left knee    Plantar fascial fibromatosis 06/05/2019    Plantar fasciitis, right    Sprain of anterior cruciate ligament of left knee, initial encounter 10/18/2021    Left ACL tear    Thalassemia minor 09/22/2015    Thalassemia trait     Past Surgical History:   Procedure Laterality Date    ANKLE ARTHROSCOPY W/ OPEN REPAIR  04/07/2014    Ankle Repair    HERNIA REPAIR  04/07/2014    Hernia Repair    HERNIA REPAIR  04/07/2014    Inguinal Hernia Repair    OTHER SURGICAL HISTORY  10/18/2021    Anterior cruciate ligament repair    OTHER SURGICAL HISTORY  10/18/2021    Orbital blowout fracture repair    OTHER SURGICAL HISTORY  10/18/2021    Colonoscopy    OTHER SURGICAL HISTORY  02/09/2015    Neuroplasty With Transposition Of Ulnar Nerve - At Elbow     Social History     Tobacco Use    Smoking status: Never     Passive exposure: Never    Smokeless tobacco: Never   Substance Use Topics    Alcohol use: Yes    Drug use: Never     Family History   Problem Relation Name Age of Onset    Anemia Mother      Other (CABG) Father      Dementia Father      Diabetes Father      Pancreatic cancer Father      Heart disease Other Grandparent   "   Dementia Other Grandparent     Pancreatic cancer Other Grandmother     Pancreatic cancer Other Aunt      Immunization History   Administered Date(s) Administered    Pfizer COVID-19 vaccine, bivalent, age 12 years and older (30 mcg/0.3 mL) 10/06/2022    Pfizer Purple Cap SARS-CoV-2 08/08/2021, 08/30/2021     Current Outpatient Medications   Medication Instructions    amphetamine-dextroamphetamine (Adderall) 10 mg tablet 10 mg, oral, Daily    [START ON 11/15/2024] amphetamine-dextroamphetamine (Adderall) 10 mg tablet 10 mg, oral, Daily    [START ON 12/15/2024] amphetamine-dextroamphetamine (Adderall) 10 mg tablet 10 mg, oral, Daily    amphetamine-dextroamphetamine XR (Adderall XR) 20 mg 24 hr capsule 20 mg, oral, Every morning, Do not crush or chew.    [START ON 11/15/2024] amphetamine-dextroamphetamine XR (Adderall XR) 20 mg 24 hr capsule 20 mg, oral, Every morning, Do not crush or chew.    [START ON 12/15/2024] amphetamine-dextroamphetamine XR (Adderall XR) 20 mg 24 hr capsule 20 mg, oral, Every morning, Do not crush or chew.    lisinopril 20 mg, oral, Daily    tadalafil (CIALIS) 20 mg, oral, Daily PRN    tamsulosin (FLOMAX) 0.4 mg, oral, Daily     No Known Allergies    Objective   Vitals:    10/16/24 0738   BP: 122/80   Pulse: 70   Weight: 101 kg (222 lb)   Height: 1.803 m (5' 11\")     Body mass index is 30.96 kg/m².    BP Readings from Last 3 Encounters:   10/16/24 122/80   05/29/24 124/80   01/23/24 150/90      Wt Readings from Last 3 Encounters:   10/16/24 101 kg (222 lb)   05/29/24 99.8 kg (220 lb)   01/23/24 103 kg (228 lb)        Office Visit on 10/16/2024   Component Date Value    POC Color, Urine 10/16/2024 Yellow     POC Appearance, Urine 10/16/2024 Clear     POC Glucose, Urine 10/16/2024 NEGATIVE     POC Bilirubin, Urine 10/16/2024 NEGATIVE     POC Ketones, Urine 10/16/2024 NEGATIVE     POC Specific Gravity, Ur* 10/16/2024 1.015     POC Blood, Urine 10/16/2024 NEGATIVE     POC PH, Urine 10/16/2024 6.0  "    POC Protein, Urine 10/16/2024 NEGATIVE     POC Urobilinogen, Urine 10/16/2024 0.2     Poc Nitrite, Urine 10/16/2024 NEGATIVE     POC Leukocytes, Urine 10/16/2024 NEGATIVE      Physical Exam  CONSTITUTIONAL - well nourished, well developed, looks like stated age, in no acute distress, not ill-appearing, and not tired appearing  SKIN - normal skin color and pigmentation, normal skin turgor without rash, lesions, or nodules visualized  HEAD - no trauma, normocephalic  EYES - pupils are equal and reactive to light, extraocular muscles are intact, and normal external exam  ENT - TM's intact, no injection, no signs of infection, uvula midline, normal tongue movement and throat normal, no exudate, nasal passage without discharge and patent  NECK - supple without rigidity, no neck mass was observed, no thyromegaly or thyroid nodules  CHEST - clear to auscultation, no wheezing, no crackles and no rales, good effort  CARDIAC - regular rate and regular rhythm, no skipped beats, no murmur  ABDOMEN - no organomegaly, soft, nontender, nondistended, normal bowel sounds, no guarding/rebound/rigidity, negative McBurney sign and negative Armstrong sign  RECTAL - prostate is smooth, not enlarged, nontender, no masses or nodules palpable; normal sphincter tone, no rectal masses  EXTREMITIES - no obvious or evident edema, no obvious or evident deformities  NEUROLOGICAL - normal gait, normal balance, normal motor, no ataxia, DTRs equal and symmetrical; alert, oriented and no focal signs  PSYCHIATRIC - alert, pleasant and cordial, age-appropriate  IMMUNOLOGIC - no cervical lymphadenopathy    Assessment/Plan   Problem List Items Addressed This Visit       ADHD, hyperactive-impulsive type     Stable, no changes to medication recommended         Relevant Medications    amphetamine-dextroamphetamine (Adderall) 10 mg tablet    amphetamine-dextroamphetamine (Adderall) 10 mg tablet (Start on 11/15/2024)    amphetamine-dextroamphetamine  (Adderall) 10 mg tablet (Start on 12/15/2024)    amphetamine-dextroamphetamine XR (Adderall XR) 20 mg 24 hr capsule    amphetamine-dextroamphetamine XR (Adderall XR) 20 mg 24 hr capsule (Start on 11/15/2024)    amphetamine-dextroamphetamine XR (Adderall XR) 20 mg 24 hr capsule (Start on 12/15/2024)    Other Relevant Orders    Drug Screen, Urine With Reflex to Confirmation    Primary hypertension     Stable, no changes to medication recommended  I would like to have you monitor and record blood pressures at home   Blood pressure goal should be below 130/80, ideally 120/80  If the blood pressure is too high, we need to consider making adjustments to your antihypertensive therapy         Relevant Medications    lisinopril 20 mg tablet    BPH (benign prostatic hyperplasia)     Stable, continue medications as prescribed    Due to the fact that your symptoms recur as soon as you stop taking tamsulosin, we made a shared decision to have you seen by urology to further discuss candidacy for a procedure         Relevant Medications    tamsulosin (Flomax) 0.4 mg 24 hr capsule    Obstructive sleep apnea, adult     I would highly recommend speaking with your dentist about the possibility of a mouthguard since a proper CPAP machine could not be fitted  We talked about the [facility] therapy, but at this juncture you were not interested in pursuing a surgical procedure         Thalassemia trait     We will continue to monitor stability with blood work         Relevant Orders    CBC and Auto Differential    Annual physical exam - Primary     Complete history and physical examination was performed  Sinus rhythm, incomplete RBBB, asymptomatic  We will notify of test results once available         Relevant Orders    Prostate Specific Antigen    Lipid Panel    TSH with reflex to Free T4 if abnormal    Comprehensive Metabolic Panel    CBC and Auto Differential    HIV 1/2 Antigen/Antibody Screen with Reflex to Confirmation    Hepatitis C  Antibody    POCT UA (nonautomated) manually resulted (Completed)    ECG 12 Lead (Completed)    Prostate cancer screening    Relevant Orders    Prostate Specific Antigen    Encounter for screening for HIV    Relevant Orders    HIV 1/2 Antigen/Antibody Screen with Reflex to Confirmation    Need for hepatitis C screening test    Relevant Orders    Hepatitis C Antibody     Other Visit Diagnoses       Medication management        Relevant Orders    Drug Screen, Urine With Reflex to Confirmation

## 2024-10-16 ENCOUNTER — APPOINTMENT (OUTPATIENT)
Dept: PRIMARY CARE | Facility: CLINIC | Age: 54
End: 2024-10-16
Payer: COMMERCIAL

## 2024-10-16 VITALS
WEIGHT: 222 LBS | SYSTOLIC BLOOD PRESSURE: 122 MMHG | HEART RATE: 70 BPM | BODY MASS INDEX: 31.08 KG/M2 | DIASTOLIC BLOOD PRESSURE: 80 MMHG | HEIGHT: 71 IN

## 2024-10-16 DIAGNOSIS — Z00.00 ANNUAL PHYSICAL EXAM: Primary | ICD-10-CM

## 2024-10-16 DIAGNOSIS — Z79.899 MEDICATION MANAGEMENT: ICD-10-CM

## 2024-10-16 DIAGNOSIS — R35.1 BENIGN PROSTATIC HYPERPLASIA WITH NOCTURIA: ICD-10-CM

## 2024-10-16 DIAGNOSIS — R39.14 BENIGN PROSTATIC HYPERPLASIA WITH INCOMPLETE BLADDER EMPTYING: ICD-10-CM

## 2024-10-16 DIAGNOSIS — Z11.4 ENCOUNTER FOR SCREENING FOR HIV: ICD-10-CM

## 2024-10-16 DIAGNOSIS — I10 PRIMARY HYPERTENSION: ICD-10-CM

## 2024-10-16 DIAGNOSIS — G47.33 OBSTRUCTIVE SLEEP APNEA, ADULT: ICD-10-CM

## 2024-10-16 DIAGNOSIS — Z11.59 NEED FOR HEPATITIS C SCREENING TEST: ICD-10-CM

## 2024-10-16 DIAGNOSIS — F90.1 ADHD, HYPERACTIVE-IMPULSIVE TYPE: ICD-10-CM

## 2024-10-16 DIAGNOSIS — Z12.5 PROSTATE CANCER SCREENING: ICD-10-CM

## 2024-10-16 DIAGNOSIS — D56.3 THALASSEMIA TRAIT: ICD-10-CM

## 2024-10-16 DIAGNOSIS — N40.1 BENIGN PROSTATIC HYPERPLASIA WITH INCOMPLETE BLADDER EMPTYING: ICD-10-CM

## 2024-10-16 DIAGNOSIS — N40.1 BENIGN PROSTATIC HYPERPLASIA WITH NOCTURIA: ICD-10-CM

## 2024-10-16 LAB
AMPHETAMINES UR QL SCN: ABNORMAL
BARBITURATES UR QL SCN: ABNORMAL
BENZODIAZ UR QL SCN: ABNORMAL
BZE UR QL SCN: ABNORMAL
CANNABINOIDS UR QL SCN: ABNORMAL
FENTANYL+NORFENTANYL UR QL SCN: ABNORMAL
METHADONE UR QL SCN: ABNORMAL
OPIATES UR QL SCN: ABNORMAL
OXYCODONE+OXYMORPHONE UR QL SCN: ABNORMAL
PCP UR QL SCN: ABNORMAL
POC APPEARANCE, URINE: CLEAR
POC BILIRUBIN, URINE: NEGATIVE
POC BLOOD, URINE: NEGATIVE
POC COLOR, URINE: YELLOW
POC GLUCOSE, URINE: NEGATIVE MG/DL
POC KETONES, URINE: NEGATIVE MG/DL
POC LEUKOCYTES, URINE: NEGATIVE
POC NITRITE,URINE: NEGATIVE
POC PH, URINE: 6 PH
POC PROTEIN, URINE: NEGATIVE MG/DL
POC SPECIFIC GRAVITY, URINE: 1.01
POC UROBILINOGEN, URINE: 0.2 EU/DL

## 2024-10-16 PROCEDURE — 81002 URINALYSIS NONAUTO W/O SCOPE: CPT | Performed by: FAMILY MEDICINE

## 2024-10-16 PROCEDURE — 3074F SYST BP LT 130 MM HG: CPT | Performed by: FAMILY MEDICINE

## 2024-10-16 PROCEDURE — 3008F BODY MASS INDEX DOCD: CPT | Performed by: FAMILY MEDICINE

## 2024-10-16 PROCEDURE — 99214 OFFICE O/P EST MOD 30 MIN: CPT | Performed by: FAMILY MEDICINE

## 2024-10-16 PROCEDURE — 1036F TOBACCO NON-USER: CPT | Performed by: FAMILY MEDICINE

## 2024-10-16 PROCEDURE — 3079F DIAST BP 80-89 MM HG: CPT | Performed by: FAMILY MEDICINE

## 2024-10-16 PROCEDURE — 99396 PREV VISIT EST AGE 40-64: CPT | Performed by: FAMILY MEDICINE

## 2024-10-16 PROCEDURE — 93000 ELECTROCARDIOGRAM COMPLETE: CPT | Performed by: FAMILY MEDICINE

## 2024-10-16 RX ORDER — DEXTROAMPHETAMINE SACCHARATE, AMPHETAMINE ASPARTATE MONOHYDRATE, DEXTROAMPHETAMINE SULFATE AND AMPHETAMINE SULFATE 5; 5; 5; 5 MG/1; MG/1; MG/1; MG/1
20 CAPSULE, EXTENDED RELEASE ORAL EVERY MORNING
Qty: 30 CAPSULE | Refills: 0 | Status: SHIPPED | OUTPATIENT
Start: 2024-12-15 | End: 2025-01-14

## 2024-10-16 RX ORDER — TAMSULOSIN HYDROCHLORIDE 0.4 MG/1
0.4 CAPSULE ORAL DAILY
Qty: 90 CAPSULE | Refills: 1 | Status: SHIPPED | OUTPATIENT
Start: 2024-10-16 | End: 2025-04-14

## 2024-10-16 RX ORDER — DEXTROAMPHETAMINE SACCHARATE, AMPHETAMINE ASPARTATE MONOHYDRATE, DEXTROAMPHETAMINE SULFATE AND AMPHETAMINE SULFATE 5; 5; 5; 5 MG/1; MG/1; MG/1; MG/1
20 CAPSULE, EXTENDED RELEASE ORAL EVERY MORNING
Qty: 30 CAPSULE | Refills: 0 | Status: SHIPPED | OUTPATIENT
Start: 2024-10-16 | End: 2024-11-15

## 2024-10-16 RX ORDER — DEXTROAMPHETAMINE SACCHARATE, AMPHETAMINE ASPARTATE, DEXTROAMPHETAMINE SULFATE AND AMPHETAMINE SULFATE 2.5; 2.5; 2.5; 2.5 MG/1; MG/1; MG/1; MG/1
10 TABLET ORAL DAILY
Qty: 30 TABLET | Refills: 0 | Status: SHIPPED | OUTPATIENT
Start: 2024-10-16 | End: 2024-11-15

## 2024-10-16 RX ORDER — DEXTROAMPHETAMINE SACCHARATE, AMPHETAMINE ASPARTATE, DEXTROAMPHETAMINE SULFATE AND AMPHETAMINE SULFATE 2.5; 2.5; 2.5; 2.5 MG/1; MG/1; MG/1; MG/1
10 TABLET ORAL DAILY
Qty: 30 TABLET | Refills: 0 | Status: SHIPPED | OUTPATIENT
Start: 2024-12-15 | End: 2025-01-14

## 2024-10-16 RX ORDER — DEXTROAMPHETAMINE SACCHARATE, AMPHETAMINE ASPARTATE MONOHYDRATE, DEXTROAMPHETAMINE SULFATE AND AMPHETAMINE SULFATE 5; 5; 5; 5 MG/1; MG/1; MG/1; MG/1
20 CAPSULE, EXTENDED RELEASE ORAL EVERY MORNING
Qty: 30 CAPSULE | Refills: 0 | Status: SHIPPED | OUTPATIENT
Start: 2024-11-15 | End: 2024-12-15

## 2024-10-16 RX ORDER — DEXTROAMPHETAMINE SACCHARATE, AMPHETAMINE ASPARTATE, DEXTROAMPHETAMINE SULFATE AND AMPHETAMINE SULFATE 2.5; 2.5; 2.5; 2.5 MG/1; MG/1; MG/1; MG/1
10 TABLET ORAL DAILY
Qty: 30 TABLET | Refills: 0 | Status: SHIPPED | OUTPATIENT
Start: 2024-11-15 | End: 2024-12-15

## 2024-10-16 RX ORDER — LISINOPRIL 20 MG/1
20 TABLET ORAL DAILY
Qty: 90 TABLET | Refills: 1 | Status: SHIPPED | OUTPATIENT
Start: 2024-10-16 | End: 2025-04-14

## 2024-10-16 ASSESSMENT — PATIENT HEALTH QUESTIONNAIRE - PHQ9
SUM OF ALL RESPONSES TO PHQ9 QUESTIONS 1 AND 2: 0
1. LITTLE INTEREST OR PLEASURE IN DOING THINGS: NOT AT ALL
1. LITTLE INTEREST OR PLEASURE IN DOING THINGS: NOT AT ALL
2. FEELING DOWN, DEPRESSED OR HOPELESS: NOT AT ALL
SUM OF ALL RESPONSES TO PHQ9 QUESTIONS 1 AND 2: 0
2. FEELING DOWN, DEPRESSED OR HOPELESS: NOT AT ALL

## 2024-10-16 NOTE — ASSESSMENT & PLAN NOTE
I would highly recommend speaking with your dentist about the possibility of a mouthguard since a proper CPAP machine could not be fitted  We talked about the [facility] therapy, but at this juncture you were not interested in pursuing a surgical procedure

## 2024-10-16 NOTE — ASSESSMENT & PLAN NOTE
Complete history and physical examination was performed  Sinus rhythm, incomplete RBBB, asymptomatic  We will notify of test results once available

## 2024-10-20 LAB
AMPHET UR-MCNC: 1219 NG/ML
MDA UR-MCNC: <200 NG/ML
MDEA UR-MCNC: <200 NG/ML
MDMA UR-MCNC: <200 NG/ML
METHAMPHET UR-MCNC: <200 NG/ML
PHENTERMINE UR CFM-MCNC: <200 NG/ML

## 2024-12-22 DIAGNOSIS — R39.14 BENIGN PROSTATIC HYPERPLASIA WITH INCOMPLETE BLADDER EMPTYING: ICD-10-CM

## 2024-12-22 DIAGNOSIS — N40.1 BENIGN PROSTATIC HYPERPLASIA WITH INCOMPLETE BLADDER EMPTYING: ICD-10-CM

## 2024-12-23 ENCOUNTER — OFFICE VISIT (OUTPATIENT)
Dept: PRIMARY CARE | Facility: CLINIC | Age: 54
End: 2024-12-23
Payer: COMMERCIAL

## 2024-12-23 VITALS
DIASTOLIC BLOOD PRESSURE: 78 MMHG | HEART RATE: 70 BPM | SYSTOLIC BLOOD PRESSURE: 128 MMHG | HEIGHT: 71 IN | WEIGHT: 224 LBS | BODY MASS INDEX: 31.36 KG/M2

## 2024-12-23 DIAGNOSIS — M79.652 PAIN OF LEFT THIGH: Primary | ICD-10-CM

## 2024-12-23 DIAGNOSIS — M79.652 PAIN OF LEFT THIGH: ICD-10-CM

## 2024-12-23 DIAGNOSIS — S76.112A RUPTURE OF LEFT QUADRICEPS MUSCLE, INITIAL ENCOUNTER: ICD-10-CM

## 2024-12-23 DIAGNOSIS — S76.112A RUPTURE OF LEFT QUADRICEPS MUSCLE, INITIAL ENCOUNTER: Primary | ICD-10-CM

## 2024-12-23 PROCEDURE — 1036F TOBACCO NON-USER: CPT | Performed by: FAMILY MEDICINE

## 2024-12-23 PROCEDURE — 3008F BODY MASS INDEX DOCD: CPT | Performed by: FAMILY MEDICINE

## 2024-12-23 PROCEDURE — 3078F DIAST BP <80 MM HG: CPT | Performed by: FAMILY MEDICINE

## 2024-12-23 PROCEDURE — 99214 OFFICE O/P EST MOD 30 MIN: CPT | Performed by: FAMILY MEDICINE

## 2024-12-23 PROCEDURE — 3074F SYST BP LT 130 MM HG: CPT | Performed by: FAMILY MEDICINE

## 2024-12-23 RX ORDER — TAMSULOSIN HYDROCHLORIDE 0.4 MG/1
0.4 CAPSULE ORAL DAILY
Qty: 90 CAPSULE | Refills: 0 | Status: SHIPPED | OUTPATIENT
Start: 2024-12-23 | End: 2025-03-23

## 2024-12-23 ASSESSMENT — PATIENT HEALTH QUESTIONNAIRE - PHQ9
2. FEELING DOWN, DEPRESSED OR HOPELESS: NOT AT ALL
1. LITTLE INTEREST OR PLEASURE IN DOING THINGS: NOT AT ALL
SUM OF ALL RESPONSES TO PHQ9 QUESTIONS 1 AND 2: 0

## 2024-12-23 NOTE — ASSESSMENT & PLAN NOTE
MRI ordered, will follow up with results and if orthopedic surgery consult is needed.  Continue to take OTC Analgesics as needed.

## 2024-12-23 NOTE — ASSESSMENT & PLAN NOTE
I highly suspect that you tore your quadricep muscle which is why we need to get an MRI  I would like to have this done stat because it happened nearly a month ago, and if repair is needed, we have to make this happen quickly

## 2024-12-23 NOTE — PROGRESS NOTES
"Subjective   Patient ID: Leo Lambert is a 54 y.o. male who presents for Pain (In quad).    Past Medical, Surgical, and Family History reviewed and updated in chart.    Reviewed all medications by prescribing practitioner or clinical pharmacist (such as prescriptions, OTCs, herbal therapies and supplements) and documented in the medical record.    CHRISTINE Bailey initially presented to the ED with left leg pain following a fall about a month ago. He reported bruising on the medial thigh and persistent significant pain in the anterior left thigh since the incident. At the time, he was able to ambulate but experienced pain with movement.      On Thanksgiving, Leo reports slipping on snowy grass, hearing a \"pop\" in his left thigh, and falling into a position similar to the splits. Five days later, he went to the ED due to significant bruising in the anteromedial and posteromedial thigh regions. A CT scan performed at that time was unremarkable.      He is now presenting, nearly 3.5 weeks after the initial incident, with limited range of motion and a palpable dip in the left anterior thigh. He has not been taking any OTC medications and has not engaged in physical or occupational therapy.      Review of Systems  All pertinent positive symptoms are included in the history of present illness.    All other systems have been reviewed and are negative and noncontributory to this patient's current ailments.    Past Medical History:   Diagnosis Date    Attention-deficit hyperactivity disorder, unspecified type 12/23/2022    Attention deficit hyperactivity disorder    Essential (primary) hypertension 09/26/2022    Benign essential hypertension    Pain in left knee 10/15/2021    Chronic pain of left knee    Plantar fascial fibromatosis 06/05/2019    Plantar fasciitis, right    Sprain of anterior cruciate ligament of left knee, initial encounter 10/18/2021    Left ACL tear    Thalassemia minor 09/22/2015    Thalassemia trait "     Past Surgical History:   Procedure Laterality Date    ANKLE ARTHROSCOPY W/ OPEN REPAIR  04/07/2014    Ankle Repair    HERNIA REPAIR  04/07/2014    Hernia Repair    HERNIA REPAIR  04/07/2014    Inguinal Hernia Repair    OTHER SURGICAL HISTORY  10/18/2021    Anterior cruciate ligament repair    OTHER SURGICAL HISTORY  10/18/2021    Orbital blowout fracture repair    OTHER SURGICAL HISTORY  10/18/2021    Colonoscopy    OTHER SURGICAL HISTORY  02/09/2015    Neuroplasty With Transposition Of Ulnar Nerve - At Elbow     Social History     Tobacco Use    Smoking status: Never     Passive exposure: Never    Smokeless tobacco: Never   Substance Use Topics    Alcohol use: Yes    Drug use: Never     Family History   Problem Relation Name Age of Onset    Anemia Mother      Other (CABG) Father      Dementia Father      Diabetes Father      Pancreatic cancer Father      Heart disease Other Grandparent     Dementia Other Grandparent     Pancreatic cancer Other Grandmother     Pancreatic cancer Other Aunt      Immunization History   Administered Date(s) Administered    Pfizer COVID-19 vaccine, bivalent, age 12 years and older (30 mcg/0.3 mL) 10/06/2022    Pfizer Purple Cap SARS-CoV-2 08/08/2021, 08/30/2021     Current Outpatient Medications   Medication Instructions    amphetamine-dextroamphetamine (Adderall) 10 mg tablet 10 mg, oral, Daily    amphetamine-dextroamphetamine (Adderall) 10 mg tablet 10 mg, oral, Daily    amphetamine-dextroamphetamine (Adderall) 10 mg tablet 10 mg, oral, Daily    amphetamine-dextroamphetamine XR (Adderall XR) 20 mg 24 hr capsule 20 mg, oral, Every morning, Do not crush or chew.    amphetamine-dextroamphetamine XR (Adderall XR) 20 mg 24 hr capsule 20 mg, oral, Every morning, Do not crush or chew.    amphetamine-dextroamphetamine XR (Adderall XR) 20 mg 24 hr capsule 20 mg, oral, Every morning, Do not crush or chew.    lisinopril 20 mg, oral, Daily    tadalafil (CIALIS) 20 mg, oral, Daily PRN     "tamsulosin (FLOMAX) 0.4 mg, oral, Daily     No Known Allergies    Objective   Vitals:    12/23/24 1323   BP: 128/78   Pulse: 70   Weight: 102 kg (224 lb)   Height: 1.803 m (5' 11\")     Body mass index is 31.24 kg/m².    BP Readings from Last 3 Encounters:   12/23/24 128/78   10/16/24 122/80   05/29/24 124/80      Wt Readings from Last 3 Encounters:   12/23/24 102 kg (224 lb)   10/16/24 101 kg (222 lb)   05/29/24 99.8 kg (220 lb)     IMPRESSION:  1.  No acute osseous abnormalities are identified.  2.  Osteoarthritis.  3.  Mild stranding involving the distal aspect of the rectus femoris  muscle/superficial aspect of the quadriceps tendon which may represent  edema.  If further evaluation of the quadriceps tendon is felt warranted,  MRI may be helpful.    : OMAR    Transcribe Date/Time: Dec  2 2024  8:47P    Dictated by : JESICA MERINO MD    This examination was interpreted and the report reviewed and  electronically signed by:  JESICA MERINO MD on Dec  2 2024  9:21PM  EST    * * *Final Report* * *    DATE OF EXAM: Dec  2 2024  8:32PM      [facility]   2006  -  CT COMPLETE LEG W IVCON LT  / ACCESSION #  [id number]    PROCEDURE REASON: Lower leg trauma, no prior imaging        * * * * Physician Interpretation * * * *     CT OF LEFT LOWER EXTREMITY WITH CONTRAST.    HISTORY: Pain    COMPARISON: None available.    TECHNIQUE: Thin helical images through the left lower extremity from the  level of the hip to the left foot with intravenous contrast. 100 cc of  Omnipaque 350 was administered intravenously.    Multiplanar reformatted images are reviewed.    CT Dose-Length Product: 455 mGy*cm.  CT Dose Reduction Employed: Yes    RESULT: No evidence of acute fracture or dislocation.  No osseous  destructive abnormalities are identified.  Postsurgical changes are noted  status post ACL repair.  Osteophytic spurring is present in the left hip,  knee and tibiotalar joint joint space narrowing. No soft tissue masses " or  fluid collections are identified.  A trace knee joint effusion is noted.    There is mild stranding in the soft tissues near the distal aspect of the  rectus femoris muscle near the superficial aspect of the quadriceps  tendon which may represent a small amount of edema.  No masses or areas  of abnormal enhancement are identified.  Vasculature structures appear  intact without occlusion or segment stenosis.  No evidence of aneurysm.  Exam End: 12/02/24 20:32    Specimen Collected: 12/02/24 20:32 Last Resulted: 12/02/24 21:23   Received From: [facility]  Result Received: 12/08/24 17:03     Physical Exam  CONSTITUTIONAL - well nourished, well developed, looks like stated age, in no acute distress, not ill-appearing, and not tired appearing  SKIN - normal skin color and pigmentation, normal skin turgor without rash, lesions, or nodules visualized  HEAD - no trauma, normocephalic  EXTREMITIES - no obvious or evident edema, left thigh has a palpable dip across the superomedial to inferolateral part of the vastus medialis with reproducible tenderness  PSYCHIATRIC - alert, pleasant and cordial, age-appropriate    Assessment/Plan   Problem List Items Addressed This Visit       Rupture of left quadriceps muscle - Primary     I highly suspect that you tore your quadricep muscle which is why we need to get an MRI  I would like to have this done stat because it happened nearly a month ago, and if repair is needed, we have to make this happen quickly         Relevant Orders    MR femur left wo IV contrast    Pain of left thigh     MRI ordered, will follow up with results and if orthopedic surgery consult is needed.  Continue to take OTC Analgesics as needed.         Relevant Orders    MR femur left wo IV contrast

## 2024-12-24 ENCOUNTER — HOSPITAL ENCOUNTER (OUTPATIENT)
Dept: RADIOLOGY | Facility: CLINIC | Age: 54
Discharge: HOME | End: 2024-12-24
Payer: COMMERCIAL

## 2024-12-24 DIAGNOSIS — M79.652 PAIN OF LEFT THIGH: ICD-10-CM

## 2024-12-24 DIAGNOSIS — S76.112A RUPTURE OF LEFT QUADRICEPS MUSCLE, INITIAL ENCOUNTER: ICD-10-CM

## 2024-12-24 PROCEDURE — 73718 MRI LOWER EXTREMITY W/O DYE: CPT | Mod: LT

## 2024-12-24 PROCEDURE — 73718 MRI LOWER EXTREMITY W/O DYE: CPT | Mod: LEFT SIDE | Performed by: RADIOLOGY

## 2024-12-24 NOTE — RESULT ENCOUNTER NOTE
The MRI shows a tear in the rectus femoris muscle of your right quadriceps. This tear occurs near the lower end of the thigh bone (femur), where the muscle connects to the tendon. There is a 4 cm gap between the two parts of the muscle.     Fortunately, the other muscles in the quadriceps are unaffected. There is some minor swelling around the muscle, but no significant fluid collection or other issues were found, such as fractures or masses.     Our orthopedic team is already involved in your care, and they will reach out to you to help you with the next steps in your treatment.

## 2024-12-26 ENCOUNTER — TELEPHONE (OUTPATIENT)
Dept: ORTHOPEDIC SURGERY | Facility: HOSPITAL | Age: 54
End: 2024-12-26
Payer: COMMERCIAL

## 2024-12-26 NOTE — TELEPHONE ENCOUNTER
I can not leave a message for Mr. Lambert to call back because his mailbox is full. Needs a telehealth visit for 12/30/24.

## 2024-12-27 ENCOUNTER — TELEPHONE (OUTPATIENT)
Dept: ORTHOPEDIC SURGERY | Facility: HOSPITAL | Age: 54
End: 2024-12-27
Payer: COMMERCIAL

## 2024-12-27 NOTE — TELEPHONE ENCOUNTER
2nd attempt on trying to reach Mr. Lambert so that a telehealth visit can be set up for Monday 12/30/24.

## 2025-01-07 ENCOUNTER — APPOINTMENT (OUTPATIENT)
Dept: ORTHOPEDIC SURGERY | Facility: CLINIC | Age: 55
End: 2025-01-07
Payer: COMMERCIAL

## 2025-01-07 DIAGNOSIS — S76.112A RUPTURE OF LEFT QUADRICEPS MUSCLE, INITIAL ENCOUNTER: Primary | ICD-10-CM

## 2025-01-07 DIAGNOSIS — M79.652 PAIN OF LEFT THIGH: ICD-10-CM

## 2025-01-07 PROCEDURE — 99204 OFFICE O/P NEW MOD 45 MIN: CPT | Performed by: STUDENT IN AN ORGANIZED HEALTH CARE EDUCATION/TRAINING PROGRAM

## 2025-01-07 ASSESSMENT — PAIN SCALES - GENERAL: PAINLEVEL_OUTOF10: 2

## 2025-01-07 ASSESSMENT — PAIN DESCRIPTION - DESCRIPTORS: DESCRIPTORS: BURNING

## 2025-01-07 ASSESSMENT — PAIN - FUNCTIONAL ASSESSMENT: PAIN_FUNCTIONAL_ASSESSMENT: 0-10

## 2025-02-04 ENCOUNTER — EVALUATION (OUTPATIENT)
Dept: PHYSICAL THERAPY | Facility: CLINIC | Age: 55
End: 2025-02-04
Payer: COMMERCIAL

## 2025-02-04 DIAGNOSIS — M79.652 PAIN OF LEFT THIGH: Primary | ICD-10-CM

## 2025-02-04 DIAGNOSIS — S76.112D RUPTURE OF LEFT QUADRICEPS MUSCLE, SUBSEQUENT ENCOUNTER: ICD-10-CM

## 2025-02-04 PROCEDURE — 97110 THERAPEUTIC EXERCISES: CPT | Mod: GP

## 2025-02-04 PROCEDURE — 97161 PT EVAL LOW COMPLEX 20 MIN: CPT | Mod: GP

## 2025-02-04 NOTE — PROGRESS NOTES
Physical Therapy Evaluation    Patient Name: Leo Lambert  MRN: [id number]  Today's Date: 25           Time Calculation  Start Time: 1030  Stop Time: 1115  Time Calculation (min): 45 min  PT Therapeutic Procedures Time Entry  Therapeutic Exercise Time Entry: 10  Self-Care/Home Mgmt Trainin    Insurance:  Visit number:   Insurance Type: Payor: [facility] / Plan: [facility] / Product Type: *No Product type* /   Authorization or Plan of Care date Range:     All screens performed per EPIC flow sheets    Therapy diagnoses:   1. Pain of left thigh  Follow Up In Physical Therapy      2. Rupture of left quadriceps muscle, subsequent encounter  Referral to Physical Therapy    Follow Up In Physical Therapy             Precautions:  HTN  Fall Risk: None    SUBJECTIVE:  Slipped Thanksgiving did a weird split with his leg. Loud pop. Painful. Bruising and pain. Waited a week then went to the ER. Was told it was all OK. Remained active. Noticed a bulge and dent in his leg. Primary care did an MRI. Had a consult with surgeon. Not a surgical candidate.   Has significant right knee OA.   Pt is very active. Is required to walk a lot for his job.   Is not on work restrictions. Is able to do all work duties.     MRI - There is a transverse tear through the rectus femoris component of  the right quadriceps at the level of the distal femoral diaphysis  with complete disruption of that muscle unit at the distal  myotendinous junction and a 4 cm gap between the proximal and distal  portions.    Pain:  Left quad mid muscle area pain   1-2/10  Home Living:  Multi level home   Works full time, active job   Prior level of function:  INDP     OBJECTIVE:  Knee AROM: (degrees) Left Right   Flexion 121 133   Extension 3 0       Knee Strength: MMT Left Right   Flexion 5/5 5/5   Extension NT/5 5/5     Hip AROM WNL   HIP MMT  Hip flex willam 5/5, abd 5/5, add 5/5, ext 5/5, IR ER 4+/5  ANKLE AROM WNL  Ankle  MMT DF PF 5/5 willam     Quad lag 9 degrees left, 3 right     Swelling/Girth: - noted defect mid muscle belly left quad, bulge anterior to the defect, does not hurt to palpate.   Gait: WFL, no noted deficit   Palpation: negative homans willam   Patellar mobility: increased mobility left as compared to right   Flexibility:   Hamstrings: mild deficit willam    Quadriceps: mild deficit right left NT    Hip Flexors: mild deficit willam   SLS - increased instability left with SLS   Up stairs is OK, down stairs is painful   LEFS 72/80    ASSESSMENT:  Pt is s/p left quad muscle tear with defect. Pt is not a surgical candidate. Pt with skilled need for PT to address instability of the LLE, strength, and mobility tolerance after a period of healing. Pt educated in POC and role of PT. Pt educated in tissue healing, strain on the quadriceps muscle, and HEP. Pt with skilled need for PT to improve functional tolerance and strength. Pt is motivated and has a job on his feet all day needs to be able to walk/reach/squat.   Pt presents with the following deficits/problems that indicate a skilled need for PT:   ROM, strength, balance, eccentric strength     Level of Complexity: low     TREATMENT:  Use of thigh compression   HEP   Exercise per below     PATIENT EDUCATION:  HEP  goals  safety  POC  interventions selected      PLAN:   Pt to be seen 1-2 times per month     Pt POC to include:  Therapeutic EX, Therapeutic ACT, NMR, Self care, Manual therapy, Gait training, CP/MHP, Education    Access Code: [email or web address]  URL: [email or web address]  Date: 02/04/2025  Prepared by: Mer Rodríguez    Exercises  - Prone Hip Extension  - 1 x daily - 7 x weekly - 2 sets - 10 reps  - Prone Alternating Arm and Leg Lifts  - 1 x daily - 7 x weekly - 2 sets - 10 reps  - Sidelying Hip Abduction  - 1 x daily - 7 x weekly - 2 sets - 10 reps  - Single Leg Stance  - 1 x daily - 7 x weekly - 2 sets - 5 reps - 30 hold  - Tandem Stance  - 1 x daily - 7 x  weekly - 2 sets - 5 reps - 30 hold    Rehab potential:  Good   Plan of care agreement  Patient   GOALS:  Active       PT Problem       PT Goal 1       Start:  02/05/25    Expected End:  04/06/25       1) Pain will be reduced to 0/10 at rest no more than 2/10 with activity.   2) Function will be increased to be able to complete all work duties safely with pain 2/10 or less. Return to walking for exercise 4 x week pain 2/10 or less  3) ROM will be increased to be WNL at LLE   4) Strength to be increased to be WNL at 4+/5 LLE   5) Independent in Home Exercise Program  6) Independent return to all household and community activity with use of compression sleeve, RICE, and activity selection.   7) Outcome tool improvement to LEFS 75/80 or higher   8) eccentric loading LLE nick with no pain and improved strength to be WFL                Patient Stated Goal 1       Start:  02/05/25    Expected End:  04/06/25       Range of motion and strength

## 2025-02-05 ENCOUNTER — APPOINTMENT (OUTPATIENT)
Dept: ORTHOPEDIC SURGERY | Facility: CLINIC | Age: 55
End: 2025-02-05
Payer: COMMERCIAL

## 2025-02-05 ASSESSMENT — ENCOUNTER SYMPTOMS
OCCASIONAL FEELINGS OF UNSTEADINESS: 0
DEPRESSION: 0
LOSS OF SENSATION IN FEET: 0

## 2025-03-02 NOTE — PROGRESS NOTES
PRIMARY CARE PHYSICIAN: Modesto Dow DO  REFERRING PROVIDER: Modesto Dow DO  [address]  [facility], [address]  [place] [address]     CONSULT ORTHOPAEDIC: Knee Evaluation    ASSESSMENT & PLAN    Impression/Plan: This is a 55-year-old male presenting for evaluation of acute left thigh pain status post a fall on Thanksgiving.  Based on clinical history, physical examination, and imaging he has suffered a tear of the quadriceps musculature without disruption of the quadriceps tendon.  His straight leg raise/extensor mechanism is intact.  At this time, I do not believe he warrants operative intervention due to his ability to straight leg raise without difficulty.  We discussed that he would benefit from a course of physical therapy working on strength and range of motion.  He will follow-up in 6 weeks for repeat evaluation.  All questions were answered.      SUBJECTIVE  CHIEF COMPLAINT:   Chief Complaint   Patient presents with    Left Thigh - Pain     NPV -  Slipped on snow on in his yard on Thanksgiving morning. Had CT done at [facility], MRI done at . Ref by Dr. Dow.         HPI: eLo Lambert is a 55 y.o. patient.  The patient presents for evaluation of left thigh pain.  He fell on Thanksgiving resulting in a popping sensation and pain over the anterior thigh.  He has recently underwent MRI in late December presents today for further evaluation.  He denies any previous history of thigh, quadriceps, knee surgeries.  He is able to ambulate without assistive device.  He has no distal numbness or tingling.  He presents today first-time orthopedic valuation regarding this injury.  The patient has not started physical therapy and is managing his pain with over-the-counter anti-inflammatories.      REVIEW OF SYSTEMS  Constitutional: See HPI for pain assessment, No significant weight loss, recent trauma  Cardiovascular: No chest pain, shortness of breath  Respiratory: No difficulty  breathing, cough  Gastrointestinal: No nausea, vomiting, diarrhea, constipation  Musculoskeletal: Noted in HPI, positive for pain, restricted motion, stiffness  Integumentary: No rashes, easy bruising, redness   Neurological: no numbness or tingling in extremities, no gait disturbances   Psychiatric: No mood changes, memory changes, social issues  Heme/Lymph: no excessive swelling, easy bruising, excessive bleeding  ENT: No hearing changes  Eyes: No vision changes    Past Medical History:   Diagnosis Date    Attention-deficit hyperactivity disorder, unspecified type 12/23/2022    Attention deficit hyperactivity disorder    Essential (primary) hypertension 09/26/2022    Benign essential hypertension    Pain in left knee 10/15/2021    Chronic pain of left knee    Plantar fascial fibromatosis 06/05/2019    Plantar fasciitis, right    Sprain of anterior cruciate ligament of left knee, initial encounter 10/18/2021    Left ACL tear    Thalassemia minor 09/22/2015    Thalassemia trait        No Known Allergies     Past Surgical History:   Procedure Laterality Date    ANKLE ARTHROSCOPY W/ OPEN REPAIR  04/07/2014    Ankle Repair    HERNIA REPAIR  04/07/2014    Hernia Repair    HERNIA REPAIR  04/07/2014    Inguinal Hernia Repair    OTHER SURGICAL HISTORY  10/18/2021    Anterior cruciate ligament repair    OTHER SURGICAL HISTORY  10/18/2021    Orbital blowout fracture repair    OTHER SURGICAL HISTORY  10/18/2021    Colonoscopy    OTHER SURGICAL HISTORY  02/09/2015    Neuroplasty With Transposition Of Ulnar Nerve - At Elbow        Family History   Problem Relation Name Age of Onset    Anemia Mother      Other (CABG) Father      Dementia Father      Diabetes Father      Pancreatic cancer Father      Heart disease Other Grandparent     Dementia Other Grandparent     Pancreatic cancer Other Grandmother     Pancreatic cancer Other Aunt         Social History     Socioeconomic History    Marital status: Single     Spouse name: Not  on file    Number of children: Not on file    Years of education: Not on file    Highest education level: Not on file   Occupational History    Not on file   Tobacco Use    Smoking status: Never     Passive exposure: Never    Smokeless tobacco: Never   Substance and Sexual Activity    Alcohol use: Yes    Drug use: Never    Sexual activity: Not on file   Other Topics Concern    Not on file   Social History Narrative    Not on file     Social Drivers of Health     Financial Resource Strain: Not on file   Food Insecurity: Not on file   Transportation Needs: Not on file   Physical Activity: Not on file   Stress: Not on file   Social Connections: Not on file   Intimate Partner Violence: Not on file   Housing Stability: Not on file        CURRENT MEDICATIONS:   Current Outpatient Medications   Medication Sig Dispense Refill    amphetamine-dextroamphetamine (Adderall) 10 mg tablet Take 1 tablet (10 mg) by mouth once daily. 30 tablet 0    amphetamine-dextroamphetamine (Adderall) 10 mg tablet Take 1 tablet (10 mg) by mouth once daily. Do not fill before November 15, 2024. 30 tablet 0    amphetamine-dextroamphetamine (Adderall) 10 mg tablet Take 1 tablet (10 mg) by mouth once daily. Do not fill before December 15, 2024. 30 tablet 0    amphetamine-dextroamphetamine XR (Adderall XR) 20 mg 24 hr capsule Take 1 capsule (20 mg) by mouth once daily in the morning. Do not crush or chew. 30 capsule 0    amphetamine-dextroamphetamine XR (Adderall XR) 20 mg 24 hr capsule Take 1 capsule (20 mg) by mouth once daily in the morning. Do not crush or chew. Do not fill before November 15, 2024. 30 capsule 0    amphetamine-dextroamphetamine XR (Adderall XR) 20 mg 24 hr capsule Take 1 capsule (20 mg) by mouth once daily in the morning. Do not crush or chew. Do not fill before December 15, 2024. 30 capsule 0    lisinopril 20 mg tablet Take 1 tablet (20 mg) by mouth once daily. 90 tablet 1    tadalafil 20 mg tablet Take 1 tablet (20 mg) by mouth  "once daily as needed.      tamsulosin (Flomax) 0.4 mg 24 hr capsule Take 1 capsule (0.4 mg) by mouth once daily. 90 capsule 0     No current facility-administered medications for this visit.        OBJECTIVE    PHYSICAL EXAM      8/30/2023     9:41 AM 9/21/2023     1:29 PM 1/23/2024     7:38 AM 5/29/2024     7:36 AM 5/29/2024     8:03 AM 10/16/2024     7:38 AM 12/23/2024     1:23 PM   Vitals   Systolic 138 146 150 120 124 122 128   Diastolic 90 90 90 76 80 80 78   Heart Rate 70 70 68 72  70 70   Height   1.803 m (5' 11\") 1.803 m (5' 11\")  1.803 m (5' 11\") 1.803 m (5' 11\")   Weight (lb) 220 226 228 220  222 224   BMI 29.86 kg/m2 30.68 kg/m2 31.8 kg/m2 30.68 kg/m2  30.96 kg/m2 31.24 kg/m2   BSA (m2) 2.25 m2 2.29 m2 2.27 m2 2.24 m2  2.25 m2 2.26 m2   Visit Report Report Report Report Report Report Report Report      There is no height or weight on file to calculate BMI.    GENERAL: A/Ox3, NAD. Appears healthy, well nourished  PSYCHIATRIC: Mood stable, appropriate memory recall  EYES: EOM intact, no scleral icterus  CARDIAC: regular rate  LUNGS: Breathing non-labored  SKIN: no erythema, rashes, or ecchymoses     MUSCULOSKELETAL:  Laterality: Left knee  This is a well-appearing patient in no acute distress.  There is no effusion to the knee.  There is a palpable defect along the mid/distal third anterior thigh in association with the quadriceps muscle belly.  There is no tenderness to palpation along the medial joint line, no tenderness to palpation along lateral joint line.  Range of motion: 0 to 120 degrees without pain.  There is no pain with manipulation of patella.  The patient is able to straight leg raise without difficulty.  Negative Lachman's exam.  The knee is stable to posterior stress.  The knee is stable to varus and valgus stress at both 0 and 30 degrees knee flexion.  5/5 Strength TA, EHL, GS    NEUROVASCULAR:  - Neurovascular Status: sensation intact to light touch distally, lower extremity motor " intact  - Capillary refill brisk at extremities, Bilateral dorsalis pedis pulse 2+    Imaging: MRI of the left femur dated 12/20/2024 reveals transverse tear through the muscle belly of the rectus femoris.  The distal quadriceps tendon remains intact.        Miles Hung MD, MPH  Attending Surgeon  Sports Medicine Orthopaedic Surgery  [facility] [place]

## 2025-03-07 ENCOUNTER — APPOINTMENT (OUTPATIENT)
Dept: PRIMARY CARE | Facility: CLINIC | Age: 55
End: 2025-03-07
Payer: COMMERCIAL

## 2025-03-07 VITALS
WEIGHT: 223 LBS | SYSTOLIC BLOOD PRESSURE: 110 MMHG | HEIGHT: 71 IN | BODY MASS INDEX: 31.22 KG/M2 | DIASTOLIC BLOOD PRESSURE: 70 MMHG | HEART RATE: 70 BPM

## 2025-03-07 DIAGNOSIS — N40.1 BENIGN PROSTATIC HYPERPLASIA WITH INCOMPLETE BLADDER EMPTYING: ICD-10-CM

## 2025-03-07 DIAGNOSIS — F90.1 ADHD, HYPERACTIVE-IMPULSIVE TYPE: ICD-10-CM

## 2025-03-07 DIAGNOSIS — R39.14 BENIGN PROSTATIC HYPERPLASIA WITH INCOMPLETE BLADDER EMPTYING: ICD-10-CM

## 2025-03-07 DIAGNOSIS — I10 PRIMARY HYPERTENSION: Primary | ICD-10-CM

## 2025-03-07 PROCEDURE — 3074F SYST BP LT 130 MM HG: CPT | Performed by: FAMILY MEDICINE

## 2025-03-07 PROCEDURE — 1036F TOBACCO NON-USER: CPT | Performed by: FAMILY MEDICINE

## 2025-03-07 PROCEDURE — 3008F BODY MASS INDEX DOCD: CPT | Performed by: FAMILY MEDICINE

## 2025-03-07 PROCEDURE — 3078F DIAST BP <80 MM HG: CPT | Performed by: FAMILY MEDICINE

## 2025-03-07 PROCEDURE — 99214 OFFICE O/P EST MOD 30 MIN: CPT | Performed by: FAMILY MEDICINE

## 2025-03-07 RX ORDER — DEXTROAMPHETAMINE SACCHARATE, AMPHETAMINE ASPARTATE MONOHYDRATE, DEXTROAMPHETAMINE SULFATE AND AMPHETAMINE SULFATE 5; 5; 5; 5 MG/1; MG/1; MG/1; MG/1
20 CAPSULE, EXTENDED RELEASE ORAL EVERY MORNING
Qty: 30 CAPSULE | Refills: 0 | Status: SHIPPED | OUTPATIENT
Start: 2025-03-07 | End: 2025-04-06

## 2025-03-07 RX ORDER — DEXTROAMPHETAMINE SACCHARATE, AMPHETAMINE ASPARTATE, DEXTROAMPHETAMINE SULFATE AND AMPHETAMINE SULFATE 2.5; 2.5; 2.5; 2.5 MG/1; MG/1; MG/1; MG/1
10 TABLET ORAL DAILY
Qty: 30 TABLET | Refills: 0 | Status: CANCELLED | OUTPATIENT
Start: 2025-03-07 | End: 2025-04-06

## 2025-03-07 RX ORDER — DEXTROAMPHETAMINE SACCHARATE, AMPHETAMINE ASPARTATE, DEXTROAMPHETAMINE SULFATE AND AMPHETAMINE SULFATE 2.5; 2.5; 2.5; 2.5 MG/1; MG/1; MG/1; MG/1
10 TABLET ORAL DAILY
Qty: 30 TABLET | Refills: 0 | Status: SHIPPED | OUTPATIENT
Start: 2025-04-06 | End: 2025-05-06

## 2025-03-07 RX ORDER — LISINOPRIL 20 MG/1
20 TABLET ORAL DAILY
Qty: 90 TABLET | Refills: 1 | Status: SHIPPED | OUTPATIENT
Start: 2025-03-07 | End: 2025-09-03

## 2025-03-07 RX ORDER — TAMSULOSIN HYDROCHLORIDE 0.4 MG/1
0.4 CAPSULE ORAL DAILY
Qty: 90 CAPSULE | Refills: 1 | Status: SHIPPED | OUTPATIENT
Start: 2025-03-07 | End: 2025-09-03

## 2025-03-07 RX ORDER — DEXTROAMPHETAMINE SACCHARATE, AMPHETAMINE ASPARTATE, DEXTROAMPHETAMINE SULFATE AND AMPHETAMINE SULFATE 2.5; 2.5; 2.5; 2.5 MG/1; MG/1; MG/1; MG/1
10 TABLET ORAL DAILY
Qty: 30 TABLET | Refills: 0 | Status: SHIPPED | OUTPATIENT
Start: 2025-05-06 | End: 2025-06-05

## 2025-03-07 RX ORDER — DEXTROAMPHETAMINE SACCHARATE, AMPHETAMINE ASPARTATE MONOHYDRATE, DEXTROAMPHETAMINE SULFATE AND AMPHETAMINE SULFATE 5; 5; 5; 5 MG/1; MG/1; MG/1; MG/1
20 CAPSULE, EXTENDED RELEASE ORAL EVERY MORNING
Qty: 30 CAPSULE | Refills: 0 | Status: SHIPPED | OUTPATIENT
Start: 2025-05-06 | End: 2025-06-05

## 2025-03-07 RX ORDER — DEXTROAMPHETAMINE SACCHARATE, AMPHETAMINE ASPARTATE MONOHYDRATE, DEXTROAMPHETAMINE SULFATE AND AMPHETAMINE SULFATE 5; 5; 5; 5 MG/1; MG/1; MG/1; MG/1
20 CAPSULE, EXTENDED RELEASE ORAL EVERY MORNING
Qty: 30 CAPSULE | Refills: 0 | Status: SHIPPED | OUTPATIENT
Start: 2025-04-06 | End: 2025-05-06

## 2025-03-07 RX ORDER — DEXTROAMPHETAMINE SACCHARATE, AMPHETAMINE ASPARTATE MONOHYDRATE, DEXTROAMPHETAMINE SULFATE AND AMPHETAMINE SULFATE 5; 5; 5; 5 MG/1; MG/1; MG/1; MG/1
20 CAPSULE, EXTENDED RELEASE ORAL EVERY MORNING
Qty: 30 CAPSULE | Refills: 0 | Status: CANCELLED | OUTPATIENT
Start: 2025-03-07 | End: 2025-04-06

## 2025-03-07 RX ORDER — DEXTROAMPHETAMINE SACCHARATE, AMPHETAMINE ASPARTATE, DEXTROAMPHETAMINE SULFATE AND AMPHETAMINE SULFATE 2.5; 2.5; 2.5; 2.5 MG/1; MG/1; MG/1; MG/1
10 TABLET ORAL DAILY
Qty: 30 TABLET | Refills: 0 | Status: SHIPPED | OUTPATIENT
Start: 2025-03-07 | End: 2025-04-06

## 2025-03-07 NOTE — PROGRESS NOTES
Chief Complaint  Patient ID: Leo Lambert is a 55 y.o. male who presents for ADHD, Benign Prostatic Hypertrophy, and Hypertension.    Past Medical, Surgical, and Family History reviewed and updated in chart.    Reviewed all medications by prescribing practitioner or clinical pharmacist (such as prescriptions, OTCs, herbal therapies and supplements) and documented in the medical record.    History of Present Illness  1. ADHD    Leo is currently taking medication as noted on the chart. His Controlled Substance Agreement (CSA) and Urine Drug Screen (UDS) were updated in October 2024. He is doing well with no concerns and is requesting refills today. The medication continues to help him focus and concentrate at work. He takes Adderall 20 mg XR in the morning and then takes Adderall 10 mg in the afternoon around 2:00 PM or later.    2. Hypertension    Leo is currently taking lisinopril 20 mg daily. He reports no symptoms of acute coronary syndrome (ACS).    3. BPH    Leo is currently taking tamsulosin 4 mg daily. He has no concerns or side effects.    Review of Systems  All pertinent positive symptoms are included in the history of present illness.    All other systems have been reviewed and are negative and noncontributory to this patient's current ailments.    Past Medical History  He has a past medical history of Attention-deficit hyperactivity disorder, unspecified type (12/23/2022), Essential (primary) hypertension (09/26/2022), Pain in left knee (10/15/2021), Plantar fascial fibromatosis (06/05/2019), Sprain of anterior cruciate ligament of left knee, initial encounter (10/18/2021), and Thalassemia minor (09/22/2015).    Surgical History  He has a past surgical history that includes Ankle arthroscopy w/ open repair (04/07/2014); Hernia repair (04/07/2014); Hernia repair (04/07/2014); Other surgical history (10/18/2021); Other surgical history (10/18/2021); Other surgical history (10/18/2021); and Other  surgical history (02/09/2015).     Social History  He reports that he has never smoked. He has never been exposed to tobacco smoke. He has never used smokeless tobacco. He reports current alcohol use. He reports that he does not use drugs.    Family History   Problem Relation Name Age of Onset    Anemia Mother      Other (CABG) Father      Dementia Father      Diabetes Father      Pancreatic cancer Father      Heart disease Other Grandparent     Dementia Other Grandparent     Pancreatic cancer Other Grandmother     Pancreatic cancer Other Aunt      Outpatient Medications Prior to Visit   Medication Sig Dispense Refill    amphetamine-dextroamphetamine (Adderall) 10 mg tablet Take 1 tablet (10 mg) by mouth once daily. 30 tablet 0    amphetamine-dextroamphetamine (Adderall) 10 mg tablet Take 1 tablet (10 mg) by mouth once daily. Do not fill before November 15, 2024. 30 tablet 0    amphetamine-dextroamphetamine (Adderall) 10 mg tablet Take 1 tablet (10 mg) by mouth once daily. Do not fill before December 15, 2024. 30 tablet 0    amphetamine-dextroamphetamine XR (Adderall XR) 20 mg 24 hr capsule Take 1 capsule (20 mg) by mouth once daily in the morning. Do not crush or chew. 30 capsule 0    amphetamine-dextroamphetamine XR (Adderall XR) 20 mg 24 hr capsule Take 1 capsule (20 mg) by mouth once daily in the morning. Do not crush or chew. Do not fill before November 15, 2024. 30 capsule 0    amphetamine-dextroamphetamine XR (Adderall XR) 20 mg 24 hr capsule Take 1 capsule (20 mg) by mouth once daily in the morning. Do not crush or chew. Do not fill before December 15, 2024. 30 capsule 0    lisinopril 20 mg tablet Take 1 tablet (20 mg) by mouth once daily. 90 tablet 1    tadalafil 20 mg tablet Take 1 tablet (20 mg) by mouth once daily as needed.      tamsulosin (Flomax) 0.4 mg 24 hr capsule Take 1 capsule (0.4 mg) by mouth once daily. 90 capsule 0     No facility-administered medications prior to visit.  "    Allergies  Patient has no known allergies.    Immunization History   Administered Date(s) Administered    Pfizer COVID-19 vaccine, bivalent, age 12 years and older (30 mcg/0.3 mL) 10/06/2022    Pfizer Purple Cap SARS-CoV-2 08/08/2021, 08/30/2021     Objective   Visit Vitals  /70   Pulse 70   Ht 1.803 m (5' 11\")   Wt 101 kg (223 lb)   BMI 31.10 kg/m²   Smoking Status Never   BSA 2.25 m²        BP Readings from Last 3 Encounters:   03/07/25 110/70   12/23/24 128/78   10/16/24 122/80      Wt Readings from Last 3 Encounters:   03/07/25 101 kg (223 lb)   12/23/24 102 kg (224 lb)   10/16/24 101 kg (222 lb)      Vision  No results found.    Relevant Results  No visits with results within 1 Month(s) from this visit.   Latest known visit with results is:   Office Visit on 10/16/2024   Component Date Value    POC Color, Urine 10/16/2024 Yellow     POC Appearance, Urine 10/16/2024 Clear     POC Glucose, Urine 10/16/2024 NEGATIVE     POC Bilirubin, Urine 10/16/2024 NEGATIVE     POC Ketones, Urine 10/16/2024 NEGATIVE     POC Specific Gravity, Ur* 10/16/2024 1.015     POC Blood, Urine 10/16/2024 NEGATIVE     POC PH, Urine 10/16/2024 6.0     POC Protein, Urine 10/16/2024 NEGATIVE     POC Urobilinogen, Urine 10/16/2024 0.2     Poc Nitrite, Urine 10/16/2024 NEGATIVE     POC Leukocytes, Urine 10/16/2024 NEGATIVE     Amphetamine Screen, Urine 10/16/2024 Presumptive Positive (A)     Barbiturate Screen, Urine 10/16/2024 Presumptive Negative     Benzodiazepines Screen, * 10/16/2024 Presumptive Negative     Cannabinoid Screen, Urine 10/16/2024 Presumptive Negative     Cocaine Metabolite Scree* 10/16/2024 Presumptive Negative     Fentanyl Screen, Urine 10/16/2024 Presumptive Negative     Opiate Screen, Urine 10/16/2024 Presumptive Negative     Oxycodone Screen, Urine 10/16/2024 Presumptive Negative     PCP Screen, Urine 10/16/2024 Presumptive Negative     Methadone Screen, Urine 10/16/2024 Presumptive Negative     Methamphetamine " Quant, Ur 10/16/2024 <200     MDA, Urine 10/16/2024 <200     MDEA, Urine 10/16/2024 <200     Phentermine,Urine 10/16/2024 <200     Amphetamines,Urine 10/16/2024 1219     MDMA, Urine 10/16/2024 <200      The 10-year ASCVD risk score (Nilton ZARATE, et al., 2019) is: 5.2%    Values used to calculate the score:      Age: 55 years      Sex: Male      Is Non- : No      Diabetic: No      Tobacco smoker: No      Systolic Blood Pressure: 110 mmHg      Is BP treated: Yes      HDL Cholesterol: 50.3 mg/dL      Total Cholesterol: 174 mg/dL    Physical Exam  CONSTITUTIONAL - well nourished, well developed, looks like stated age, in no acute distress, not ill-appearing, and not tired appearing  SKIN - normal skin color and pigmentation, normal skin turgor without rash, lesions, or nodules visualized  NECK - supple without rigidity, no neck mass was observed, no thyromegaly or thyroid nodules  CHEST - clear to auscultation, no wheezing, no crackles and no rales, good effort  CARDIAC - regular rate and regular rhythm, no skipped beats, no murmur  PSYCHIATRIC - alert, pleasant and cordial, age-appropriate  IMMUNOLOGIC - no cervical lymphadenopathy    Assessment and Plan  Problem List Items Addressed This Visit       ADHD, hyperactive-impulsive type     Stable. Refills ordered. CSA and UDS up to date.         Relevant Medications    amphetamine-dextroamphetamine (Adderall) 10 mg tablet    amphetamine-dextroamphetamine (Adderall) 10 mg tablet (Start on 4/6/2025)    amphetamine-dextroamphetamine (Adderall) 10 mg tablet (Start on 5/6/2025)    amphetamine-dextroamphetamine XR (Adderall XR) 20 mg 24 hr capsule (Start on 5/6/2025)    amphetamine-dextroamphetamine XR (Adderall XR) 20 mg 24 hr capsule    amphetamine-dextroamphetamine XR (Adderall XR) 20 mg 24 hr capsule (Start on 4/6/2025)    Primary hypertension - Primary     Stable. Refills ordered.         Relevant Medications    lisinopril 20 mg tablet    BPH (benign  prostatic hyperplasia)     Stable. Refills ordered.         Relevant Medications    tamsulosin (Flomax) 0.4 mg 24 hr capsule

## 2025-06-03 ENCOUNTER — APPOINTMENT (OUTPATIENT)
Dept: PRIMARY CARE | Facility: CLINIC | Age: 55
End: 2025-06-03
Payer: COMMERCIAL

## 2025-06-03 VITALS
HEART RATE: 76 BPM | BODY MASS INDEX: 30.94 KG/M2 | HEIGHT: 71 IN | SYSTOLIC BLOOD PRESSURE: 122 MMHG | DIASTOLIC BLOOD PRESSURE: 80 MMHG | WEIGHT: 221 LBS

## 2025-06-03 DIAGNOSIS — N40.1 BENIGN PROSTATIC HYPERPLASIA WITH INCOMPLETE BLADDER EMPTYING: ICD-10-CM

## 2025-06-03 DIAGNOSIS — I10 PRIMARY HYPERTENSION: ICD-10-CM

## 2025-06-03 DIAGNOSIS — R39.14 BENIGN PROSTATIC HYPERPLASIA WITH INCOMPLETE BLADDER EMPTYING: ICD-10-CM

## 2025-06-03 DIAGNOSIS — F90.1 ADHD, HYPERACTIVE-IMPULSIVE TYPE: Primary | ICD-10-CM

## 2025-06-03 PROCEDURE — 3079F DIAST BP 80-89 MM HG: CPT | Performed by: FAMILY MEDICINE

## 2025-06-03 PROCEDURE — 99214 OFFICE O/P EST MOD 30 MIN: CPT | Performed by: FAMILY MEDICINE

## 2025-06-03 PROCEDURE — 1036F TOBACCO NON-USER: CPT | Performed by: FAMILY MEDICINE

## 2025-06-03 PROCEDURE — 3008F BODY MASS INDEX DOCD: CPT | Performed by: FAMILY MEDICINE

## 2025-06-03 PROCEDURE — 3074F SYST BP LT 130 MM HG: CPT | Performed by: FAMILY MEDICINE

## 2025-06-03 RX ORDER — DEXTROAMPHETAMINE SACCHARATE, AMPHETAMINE ASPARTATE MONOHYDRATE, DEXTROAMPHETAMINE SULFATE AND AMPHETAMINE SULFATE 5; 5; 5; 5 MG/1; MG/1; MG/1; MG/1
20 CAPSULE, EXTENDED RELEASE ORAL EVERY MORNING
Qty: 30 CAPSULE | Refills: 0 | Status: SHIPPED | OUTPATIENT
Start: 2025-08-02 | End: 2025-09-01

## 2025-06-03 RX ORDER — DEXTROAMPHETAMINE SACCHARATE, AMPHETAMINE ASPARTATE, DEXTROAMPHETAMINE SULFATE AND AMPHETAMINE SULFATE 2.5; 2.5; 2.5; 2.5 MG/1; MG/1; MG/1; MG/1
10 TABLET ORAL DAILY
Qty: 30 TABLET | Refills: 0 | Status: SHIPPED | OUTPATIENT
Start: 2025-08-02 | End: 2025-09-01

## 2025-06-03 RX ORDER — LISINOPRIL 20 MG/1
20 TABLET ORAL DAILY
Qty: 90 TABLET | Refills: 1 | Status: CANCELLED | OUTPATIENT
Start: 2025-06-03 | End: 2025-11-30

## 2025-06-03 RX ORDER — DEXTROAMPHETAMINE SACCHARATE, AMPHETAMINE ASPARTATE, DEXTROAMPHETAMINE SULFATE AND AMPHETAMINE SULFATE 2.5; 2.5; 2.5; 2.5 MG/1; MG/1; MG/1; MG/1
10 TABLET ORAL DAILY
Qty: 30 TABLET | Refills: 0 | Status: SHIPPED | OUTPATIENT
Start: 2025-07-03 | End: 2025-08-02

## 2025-06-03 RX ORDER — DEXTROAMPHETAMINE SACCHARATE, AMPHETAMINE ASPARTATE MONOHYDRATE, DEXTROAMPHETAMINE SULFATE AND AMPHETAMINE SULFATE 5; 5; 5; 5 MG/1; MG/1; MG/1; MG/1
20 CAPSULE, EXTENDED RELEASE ORAL EVERY MORNING
Qty: 30 CAPSULE | Refills: 0 | Status: SHIPPED | OUTPATIENT
Start: 2025-06-03 | End: 2025-07-03

## 2025-06-03 RX ORDER — DEXTROAMPHETAMINE SACCHARATE, AMPHETAMINE ASPARTATE MONOHYDRATE, DEXTROAMPHETAMINE SULFATE AND AMPHETAMINE SULFATE 5; 5; 5; 5 MG/1; MG/1; MG/1; MG/1
20 CAPSULE, EXTENDED RELEASE ORAL EVERY MORNING
Qty: 30 CAPSULE | Refills: 0 | Status: SHIPPED | OUTPATIENT
Start: 2025-07-03 | End: 2025-08-02

## 2025-06-03 RX ORDER — TAMSULOSIN HYDROCHLORIDE 0.4 MG/1
0.4 CAPSULE ORAL DAILY
Qty: 90 CAPSULE | Refills: 1 | Status: CANCELLED | OUTPATIENT
Start: 2025-06-03 | End: 2025-11-30

## 2025-06-03 RX ORDER — DEXTROAMPHETAMINE SACCHARATE, AMPHETAMINE ASPARTATE, DEXTROAMPHETAMINE SULFATE AND AMPHETAMINE SULFATE 2.5; 2.5; 2.5; 2.5 MG/1; MG/1; MG/1; MG/1
10 TABLET ORAL DAILY
Qty: 30 TABLET | Refills: 0 | Status: SHIPPED | OUTPATIENT
Start: 2025-06-03 | End: 2025-07-03

## 2025-06-03 NOTE — PROGRESS NOTES
Chief Complaint  Patient ID: Leo Lambert is a 55 y.o. male who presents for ADHD and Hypertension.    Past Medical, Surgical, and Family History reviewed and updated in chart.    Reviewed all medications by prescribing practitioner or clinical pharmacist (such as prescriptions, OTCs, herbal therapies and supplements) and documented in the medical record.    History of Present Illness  1. ADHD     - Current regimen includes Adderall XR (amphetamine/dextroamphetamine) 20 mg in the morning and 10 mg of instant release Adderall in the afternoon.     - Takes weekend drug holidays.     - Has been on this regimen for many years.     - Reports the dose is appropriate and manages well.     - Able to sleep at night without issues.     - Completed CSA and UDS in October 2024.    2. Hypertension (HTN)     - Taking Lisinopril 20 mg daily.     - Intake blood pressure is at goal.     - Denies side effects.    3. Benign Prostatic Hyperplasia (BPH)     - Taking Flomax (tamsulosin) 0.4 mg daily.     - Condition is well managed.    Review of Systems  All pertinent positive symptoms are included in the history of present illness.    All other systems have been reviewed and are negative and noncontributory to this patient's current ailments.    Past Medical History  He has a past medical history of Attention-deficit hyperactivity disorder, unspecified type (12/23/2022), Essential (primary) hypertension (09/26/2022), Pain in left knee (10/15/2021), Plantar fascial fibromatosis (06/05/2019), Sprain of anterior cruciate ligament of left knee, initial encounter (10/18/2021), and Thalassemia minor (09/22/2015).    Surgical History  He has a past surgical history that includes Ankle arthroscopy w/ open repair (04/07/2014); Hernia repair (04/07/2014); Hernia repair (04/07/2014); Other surgical history (10/18/2021); Other surgical history (10/18/2021); Other surgical history (10/18/2021); and Other surgical history (02/09/2015).     Social  "History  He reports that he has never smoked. He has never been exposed to tobacco smoke. He has never used smokeless tobacco. He reports current alcohol use. He reports that he does not use drugs.    Family History[1]  Medications Prior to Visit[2]    Allergies  Patient has no known allergies.    Immunization History   Administered Date(s) Administered    COVID-19, mRNA, LNP-S, PF, 30 mcg/0.3 mL dose 08/30/2021    Pfizer Purple Cap SARS-CoV-2 08/08/2021, 08/30/2021     Objective   Visit Vitals  /80   Pulse 76   Ht 1.803 m (5' 11\")   Wt 100 kg (221 lb)   BMI 30.82 kg/m²   Smoking Status Never   BSA 2.24 m²        BP Readings from Last 3 Encounters:   06/03/25 122/80   03/07/25 110/70   12/23/24 128/78      Wt Readings from Last 3 Encounters:   06/03/25 100 kg (221 lb)   03/07/25 101 kg (223 lb)   12/23/24 102 kg (224 lb)      Relevant Results  No visits with results within 1 Month(s) from this visit.   Latest known visit with results is:   Office Visit on 10/16/2024   Component Date Value    POC Color, Urine 10/16/2024 Yellow     POC Appearance, Urine 10/16/2024 Clear     POC Glucose, Urine 10/16/2024 NEGATIVE     POC Bilirubin, Urine 10/16/2024 NEGATIVE     POC Ketones, Urine 10/16/2024 NEGATIVE     POC Specific Gravity, Ur* 10/16/2024 1.015     POC Blood, Urine 10/16/2024 NEGATIVE     POC PH, Urine 10/16/2024 6.0     POC Protein, Urine 10/16/2024 NEGATIVE     POC Urobilinogen, Urine 10/16/2024 0.2     Poc Nitrite, Urine 10/16/2024 NEGATIVE     POC Leukocytes, Urine 10/16/2024 NEGATIVE     Amphetamine Screen, Urine 10/16/2024 Presumptive Positive (A)     Barbiturate Screen, Urine 10/16/2024 Presumptive Negative     Benzodiazepines Screen, * 10/16/2024 Presumptive Negative     Cannabinoid Screen, Urine 10/16/2024 Presumptive Negative     Cocaine Metabolite Scree* 10/16/2024 Presumptive Negative     Fentanyl Screen, Urine 10/16/2024 Presumptive Negative     Opiate Screen, Urine 10/16/2024 Presumptive Negative  "    Oxycodone Screen, Urine 10/16/2024 Presumptive Negative     PCP Screen, Urine 10/16/2024 Presumptive Negative     Methadone Screen, Urine 10/16/2024 Presumptive Negative     Methamphetamine Quant, Ur 10/16/2024 <200     MDA, Urine 10/16/2024 <200     MDEA, Urine 10/16/2024 <200     Phentermine,Urine 10/16/2024 <200     Amphetamines,Urine 10/16/2024 1219     MDMA, Urine 10/16/2024 <200      The 10-year ASCVD risk score (Nilton ZARATE, et al., 2019) is: 6.2%    Values used to calculate the score:      Age: 55 years      Sex: Male      Is Non- : No      Diabetic: No      Tobacco smoker: No      Systolic Blood Pressure: 122 mmHg      Is BP treated: Yes      HDL Cholesterol: 50.3 mg/dL      Total Cholesterol: 174 mg/dL    Physical Exam  CONSTITUTIONAL - well nourished, well developed, looks like stated age, in no acute distress, not ill-appearing, and not tired appearing  SKIN - normal skin color and pigmentation, normal skin turgor without rash, lesions, or nodules visualized  HEAD - no trauma, normocephalic  EYES - pupils are equal and reactive to light, extraocular muscles are intact, and normal external exam  CHEST - clear to auscultation, no wheezing, no crackles and no rales, good effort  CARDIAC - regular rate and regular rhythm, no skipped beats, no murmur  ABDOMEN - no organomegaly, soft, nontender, nondistended, normal bowel sounds, no guarding/rebound/rigidity, negative McBurney sign and negative Armstrong sign  EXTREMITIES - no obvious or evident edema, no obvious or evident deformities  NEUROLOGICAL - normal gait, normal balance, normal motor, no ataxia, DTRs equal and symmetrical; alert, oriented and no focal signs  PSYCHIATRIC - alert, pleasant and cordial, age-appropriate    Assessment and Plan  Problem List Items Addressed This Visit       ADHD, hyperactive-impulsive type - Primary    Stable. Refills ordered. CSA and UDS up to date.  OARRS reviewed  Discussed adding lifestyle  management to help with ADHD management  Continue drug holidays as much as possible         Relevant Medications    amphetamine-dextroamphetamine XR (Adderall XR) 20 mg 24 hr capsule    amphetamine-dextroamphetamine XR (Adderall XR) 20 mg 24 hr capsule (Start on 7/3/2025)    amphetamine-dextroamphetamine XR (Adderall XR) 20 mg 24 hr capsule (Start on 8/2/2025)    amphetamine-dextroamphetamine (Adderall) 10 mg tablet    amphetamine-dextroamphetamine (Adderall) 10 mg tablet (Start on 7/3/2025)    amphetamine-dextroamphetamine (Adderall) 10 mg tablet (Start on 8/2/2025)    Primary hypertension    Stable, you have refills at the pharmacy until the middle of September 2025         BPH (benign prostatic hyperplasia)    Stable, you have refills at the pharmacy until the middle of September 2025               [1]   Family History  Problem Relation Name Age of Onset    Anemia Mother      Other (CABG) Father      Dementia Father      Diabetes Father      Pancreatic cancer Father      Heart disease Other Grandparent     Dementia Other Grandparent     Pancreatic cancer Other Grandmother     Pancreatic cancer Other Aunt    [2]   Outpatient Medications Prior to Visit   Medication Sig Dispense Refill    lisinopril 20 mg tablet Take 1 tablet (20 mg) by mouth once daily. 90 tablet 1    tamsulosin (Flomax) 0.4 mg 24 hr capsule Take 1 capsule (0.4 mg) by mouth once daily. 90 capsule 1    amphetamine-dextroamphetamine (Adderall) 10 mg tablet Take 1 tablet (10 mg) by mouth once daily. 30 tablet 0    amphetamine-dextroamphetamine (Adderall) 10 mg tablet Take 1 tablet (10 mg) by mouth once daily. Do not fill before April 6, 2025. 30 tablet 0    amphetamine-dextroamphetamine (Adderall) 10 mg tablet Take 1 tablet (10 mg) by mouth once daily. Do not fill before May 6, 2025. 30 tablet 0    amphetamine-dextroamphetamine XR (Adderall XR) 20 mg 24 hr capsule Take 1 capsule (20 mg) by mouth once daily in the morning. Do not crush or chew. Do not  fill before May 6, 2025. 30 capsule 0    amphetamine-dextroamphetamine XR (Adderall XR) 20 mg 24 hr capsule Take 1 capsule (20 mg) by mouth once daily in the morning. Do not crush or chew. 30 capsule 0    amphetamine-dextroamphetamine XR (Adderall XR) 20 mg 24 hr capsule Take 1 capsule (20 mg) by mouth once daily in the morning. Do not crush or chew. Do not fill before April 6, 2025. 30 capsule 0     No facility-administered medications prior to visit.

## 2025-06-03 NOTE — ASSESSMENT & PLAN NOTE
Stable. Refills ordered. CSA and UDS up to date.  OARRS reviewed  Discussed adding lifestyle management to help with ADHD management  Continue drug holidays as much as possible   WDL